# Patient Record
Sex: MALE | Race: BLACK OR AFRICAN AMERICAN | NOT HISPANIC OR LATINO | Employment: UNEMPLOYED | URBAN - METROPOLITAN AREA
[De-identification: names, ages, dates, MRNs, and addresses within clinical notes are randomized per-mention and may not be internally consistent; named-entity substitution may affect disease eponyms.]

---

## 2020-09-08 ENCOUNTER — HOSPITAL ENCOUNTER (EMERGENCY)
Facility: HOSPITAL | Age: 45
Discharge: HOME | End: 2020-09-08
Attending: EMERGENCY MEDICINE
Payer: MEDICAID

## 2020-09-08 VITALS
DIASTOLIC BLOOD PRESSURE: 86 MMHG | RESPIRATION RATE: 18 BRPM | HEIGHT: 73 IN | TEMPERATURE: 99 F | BODY MASS INDEX: 30.48 KG/M2 | SYSTOLIC BLOOD PRESSURE: 136 MMHG | WEIGHT: 230 LBS | OXYGEN SATURATION: 96 % | HEART RATE: 96 BPM

## 2020-09-08 DIAGNOSIS — H60.90 OTITIS EXTERNA, UNSPECIFIED CHRONICITY, UNSPECIFIED LATERALITY, UNSPECIFIED TYPE: Primary | ICD-10-CM

## 2020-09-08 DIAGNOSIS — J06.9 UPPER RESPIRATORY TRACT INFECTION, UNSPECIFIED TYPE: ICD-10-CM

## 2020-09-08 PROCEDURE — U0003 INFECTIOUS AGENT DETECTION BY NUCLEIC ACID (DNA OR RNA); SEVERE ACUTE RESPIRATORY SYNDROME CORONAVIRUS 2 (SARS-COV-2) (CORONAVIRUS DISEASE [COVID-19]), AMPLIFIED PROBE TECHNIQUE, MAKING USE OF HIGH THROUGHPUT TECHNOLOGIES AS DESCRIBED BY CMS-2020-01-R: HCPCS

## 2020-09-08 PROCEDURE — 99283 EMERGENCY DEPT VISIT LOW MDM: CPT

## 2020-09-08 RX ORDER — OFLOXACIN 3 MG/ML
3 SOLUTION AURICULAR (OTIC) 2 TIMES DAILY
Qty: 42 DROP | Refills: 0 | Status: SHIPPED | OUTPATIENT
Start: 2020-09-08 | End: 2020-09-15

## 2020-09-08 RX ORDER — CIPROFLOXACIN 500 MG/1
500 TABLET ORAL 2 TIMES DAILY
Qty: 20 TABLET | Refills: 0 | Status: SHIPPED | OUTPATIENT
Start: 2020-09-08 | End: 2020-09-08 | Stop reason: CLARIF

## 2020-09-08 NOTE — DISCHARGE INSTRUCTIONS
Please follow-up with your primary care physician.  Do not smoke.  Return to the emergency department if you have any change or concerning symptoms.  Rest.  Use the ear drops as prescribed until completed.

## 2020-09-08 NOTE — ED TRIAGE NOTES
Pt. Reports left ear pain with drainage from the ear. Pt. Reports he has a productive cough- greenish in color. Pt. Reports he has been having symptoms for the past 3 days.

## 2020-09-08 NOTE — ED PROVIDER NOTES
Encounter Date: 9/8/2020    SCRIBE #1 NOTE: IAquiles, am scribing for, and in the presence of,  Bree Wong PA-C. I have scribed the following portions of the note - Other sections scribed: HPI/ROS.       History     Chief Complaint   Patient presents with    Otalgia     left ear pain started x 3 days     Pt seen by provider at 10:48    This 45 y.o. male with no pertinent medical history presents to the ED for an emergent evaluation of L otalgia with associated drainage x 4 days. Rates as 7/10, but states pain was worse at initial onset. Pt notes that transmission fluid possibly got into his ear recently while he was working. He notes similar symptoms has happened in the past, but it was a while ago. Pt attempted tx previously with Dayquil and Robitussin without relief. Pt reports improvement with productive cough with yellow sputum with these medications. Cough has been present x 2-3 days. Pt states he smokes tobacco 3x/week. No recent COVID-19 exposures. Otherwise, pt denies fever, chills, sore throat, n/v/d/, and any other associated symptoms.    The history is provided by the patient. No  was used.     Review of patient's allergies indicates:  No Known Allergies  History reviewed. No pertinent past medical history.  History reviewed. No pertinent surgical history.  History reviewed. No pertinent family history.  Social History     Tobacco Use    Smoking status: Light Tobacco Smoker     Types: Cigarettes    Smokeless tobacco: Never Used   Substance Use Topics    Alcohol use: Never     Frequency: Never    Drug use: Yes     Types: Marijuana     Review of Systems   Constitutional: Negative for chills and fever.   HENT: Positive for ear discharge and ear pain. Negative for congestion, rhinorrhea and sore throat.    Eyes: Negative for pain.   Respiratory: Positive for cough. Negative for shortness of breath.    Gastrointestinal: Negative for diarrhea, nausea and vomiting.    Musculoskeletal: Negative for myalgias.   Skin: Negative for rash.   Neurological: Negative for headaches.   All other systems reviewed and are negative.      Physical Exam     Initial Vitals [09/08/20 1023]   BP Pulse Resp Temp SpO2   132/73 66 20 98.5 °F (36.9 °C) 98 %      MAP       --         Physical Exam    Nursing note and vitals reviewed.  Constitutional: He appears well-developed and well-nourished. He is not diaphoretic. No distress.   HENT:   Head: Normocephalic and atraumatic.   Nose: Nose normal.   Mouth/Throat: Oropharynx is clear and moist.   The bilateral tympanic membranes are clear and intact.  There is tragal tenderness noted to the left tragus.  There is also erythema and edema noted to the left external auditory canal.   Eyes: EOM are normal. Pupils are equal, round, and reactive to light.   Neck: Normal range of motion. Neck supple.   Cardiovascular: Normal rate.   Pulmonary/Chest: Breath sounds normal. No respiratory distress. He has no wheezes. He has no rhonchi. He has no rales.   Abdominal: Soft. Bowel sounds are normal. He exhibits no distension. There is no abdominal tenderness. There is no rebound and no guarding.   Musculoskeletal: Normal range of motion. No tenderness or edema.   Neurological: He is alert and oriented to person, place, and time.   Skin: Skin is warm. Capillary refill takes less than 2 seconds.         ED Course   Procedures  Labs Reviewed   SARS-COV-2 (COVID-19) QUALITATIVE PCR          Imaging Results    None                APC / Resident Notes:   This is an urgent evaluation of a 45-year-old male who presents to the emergency department with left ear pain.  On further interview, the patient reports that he has had some mild upper respiratory symptoms and a mild cough.  He is a smoker.    The patient is currently afebrile and nontoxic in appearance.  His vital signs are stable.  On physical exam, there is evidence of otitis externa to left ear.  The bilateral  tympanic membranes are clear and intact without any evidence of otitis media.  There is no meningismus.  No evidence of pneumonia.  The remaining physical exam is unremarkable.  While in the emergency department a routine COVID screening was performed.  I will also treat him for left otitis externa with ear drops.  He will need to follow up with his primary care physician.  This was discussed at length with the patient verbalized understanding and agreement.  He is currently safe and stable for discharge at this time.       Scribe Attestation:   Scribe #1: I performed the above scribed service and the documentation accurately describes the services I performed. I attest to the accuracy of the note.                          Clinical Impression:       ICD-10-CM ICD-9-CM   1. Otitis externa, unspecified chronicity, unspecified laterality, unspecified type  H60.90 380.10   2. Upper respiratory tract infection, unspecified type  J06.9 465.9           Scribe Attestation: I, Bree Wong PA-C, personally performed the services described in this documentation. All medical record entries made by the scribe were at my direction and in my presence. I have reviewed the chart and agree that the record reflects my personal performance and is accurate and complete.   ED Disposition Condition    Discharge Stable        ED Prescriptions     Medication Sig Dispense Start Date End Date Auth. Provider    ciprofloxacin HCl (CIPRO) 500 MG tablet  (Status: Discontinued) Take 1 tablet (500 mg total) by mouth 2 (two) times daily. for 10 days 20 tablet 9/8/2020 9/8/2020 Bree Wong PA-C    ofloxacin (FLOXIN) 0.3 % otic solution Place 3 drops into the left ear 2 (two) times daily. for 7 days 42 drop 9/8/2020 9/15/2020 Bree Wong PA-C        Follow-up Information    None                                    Bree Wong PA-C  09/08/20 8880

## 2020-09-09 LAB — SARS-COV-2 RNA RESP QL NAA+PROBE: NOT DETECTED

## 2020-10-20 ENCOUNTER — HOSPITAL ENCOUNTER (EMERGENCY)
Facility: HOSPITAL | Age: 45
Discharge: HOME | End: 2020-10-20
Attending: EMERGENCY MEDICINE
Payer: MEDICAID

## 2020-10-20 VITALS
HEIGHT: 74 IN | HEART RATE: 63 BPM | SYSTOLIC BLOOD PRESSURE: 153 MMHG | WEIGHT: 225 LBS | RESPIRATION RATE: 18 BRPM | DIASTOLIC BLOOD PRESSURE: 92 MMHG | TEMPERATURE: 98 F | OXYGEN SATURATION: 99 % | BODY MASS INDEX: 28.88 KG/M2

## 2020-10-20 DIAGNOSIS — S69.92XA FISH HOOK INJURY OF FINGER OF LEFT HAND, INITIAL ENCOUNTER: Primary | ICD-10-CM

## 2020-10-20 PROCEDURE — 99284 EMERGENCY DEPT VISIT MOD MDM: CPT | Mod: 25

## 2020-10-20 PROCEDURE — 25000003 PHARM REV CODE 250: Performed by: NURSE PRACTITIONER

## 2020-10-20 RX ORDER — LEVOFLOXACIN 750 MG/1
750 TABLET ORAL DAILY
Qty: 10 TABLET | Refills: 0 | Status: SHIPPED | OUTPATIENT
Start: 2020-10-20 | End: 2020-10-30

## 2020-10-20 RX ORDER — LEVOFLOXACIN 750 MG/1
750 TABLET ORAL
Status: COMPLETED | OUTPATIENT
Start: 2020-10-20 | End: 2020-10-20

## 2020-10-20 RX ORDER — CLINDAMYCIN HYDROCHLORIDE 150 MG/1
450 CAPSULE ORAL EVERY 8 HOURS
Qty: 90 CAPSULE | Refills: 0 | Status: SHIPPED | OUTPATIENT
Start: 2020-10-20 | End: 2020-10-30

## 2020-10-20 RX ORDER — LIDOCAINE HYDROCHLORIDE 10 MG/ML
5 INJECTION INFILTRATION; PERINEURAL
Status: COMPLETED | OUTPATIENT
Start: 2020-10-20 | End: 2020-10-20

## 2020-10-20 RX ORDER — CLINDAMYCIN HYDROCHLORIDE 150 MG/1
450 CAPSULE ORAL
Status: COMPLETED | OUTPATIENT
Start: 2020-10-20 | End: 2020-10-20

## 2020-10-20 RX ADMIN — LEVOFLOXACIN 750 MG: 750 TABLET, FILM COATED ORAL at 12:10

## 2020-10-20 RX ADMIN — CLINDAMYCIN HYDROCHLORIDE 450 MG: 150 CAPSULE ORAL at 12:10

## 2020-10-20 RX ADMIN — BACITRACIN, NEOMYCIN, POLYMYXIN B 1 EACH: 400; 3.5; 5 OINTMENT TOPICAL at 12:10

## 2020-10-20 RX ADMIN — LIDOCAINE HYDROCHLORIDE 5 ML: 10 INJECTION, SOLUTION INFILTRATION; PERINEURAL at 12:10

## 2020-10-20 NOTE — DISCHARGE INSTRUCTIONS
Take antibiotics as ordered. Keep clean and dry.  May use neosporin.  Return to the Emergency department for any worsening or failure to improve, otherwise follow up with your primary care provider.

## 2020-10-20 NOTE — ED TRIAGE NOTES
" Pt arrives tobias the ED reports that "A fish hook gets in my hand while I was fishing." Denies any other medical concern at this time  "
>120
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
## 2022-06-03 ENCOUNTER — TELEPHONE (OUTPATIENT)
Dept: FAMILY MEDICINE | Facility: CLINIC | Age: 47
End: 2022-06-03
Payer: MEDICAID

## 2025-02-10 ENCOUNTER — HOSPITAL ENCOUNTER (INPATIENT)
Facility: HOSPITAL | Age: 50
LOS: 2 days | Discharge: HOME | DRG: 638 | End: 2025-02-13
Attending: EMERGENCY MEDICINE | Admitting: HOSPITALIST
Payer: MEDICAID

## 2025-02-10 DIAGNOSIS — E11.65 TYPE 2 DIABETES MELLITUS WITH HYPERGLYCEMIA, WITH LONG-TERM CURRENT USE OF INSULIN: Primary | ICD-10-CM

## 2025-02-10 DIAGNOSIS — R03.0 ELEVATED BP WITHOUT DIAGNOSIS OF HYPERTENSION: ICD-10-CM

## 2025-02-10 DIAGNOSIS — Z79.4 TYPE 2 DIABETES MELLITUS WITH HYPERGLYCEMIA, WITH LONG-TERM CURRENT USE OF INSULIN: Primary | ICD-10-CM

## 2025-02-10 DIAGNOSIS — R05.9 COUGH: ICD-10-CM

## 2025-02-10 DIAGNOSIS — E11.10 DKA (DIABETIC KETOACIDOSIS): ICD-10-CM

## 2025-02-10 DIAGNOSIS — I46.9 CARDIAC ARREST: ICD-10-CM

## 2025-02-10 PROBLEM — E87.6 HYPOKALEMIA: Status: ACTIVE | Noted: 2025-02-10

## 2025-02-10 PROBLEM — N17.9 AKI (ACUTE KIDNEY INJURY): Status: ACTIVE | Noted: 2025-02-10

## 2025-02-10 PROBLEM — I10 HYPERTENSION: Status: ACTIVE | Noted: 2025-02-10

## 2025-02-10 LAB
ALBUMIN SERPL BCP-MCNC: 4.1 G/DL (ref 3.5–5.2)
ALLENS TEST: ABNORMAL
ALP SERPL-CCNC: 130 U/L (ref 40–150)
ALT SERPL W/O P-5'-P-CCNC: 30 U/L (ref 10–44)
ANION GAP SERPL CALC-SCNC: 15 MMOL/L (ref 8–16)
ANION GAP SERPL CALC-SCNC: 16 MMOL/L (ref 8–16)
ANION GAP SERPL CALC-SCNC: 18 MMOL/L (ref 8–16)
ANION GAP SERPL CALC-SCNC: 19 MMOL/L (ref 8–16)
ANION GAP SERPL CALC-SCNC: 22 MMOL/L (ref 8–16)
ANION GAP SERPL CALC-SCNC: 23 MMOL/L (ref 8–16)
ANION GAP SERPL CALC-SCNC: 24 MMOL/L (ref 8–16)
APTT PPP: 25.4 SEC (ref 21–32)
AST SERPL-CCNC: 16 U/L (ref 10–40)
B-OH-BUTYR BLD STRIP-SCNC: 2.1 MMOL/L (ref 0–0.5)
BASOPHILS # BLD AUTO: 0.03 K/UL (ref 0–0.2)
BASOPHILS # BLD AUTO: 0.05 K/UL (ref 0–0.2)
BASOPHILS NFR BLD: 0.3 % (ref 0–1.9)
BASOPHILS NFR BLD: 0.5 % (ref 0–1.9)
BILIRUB SERPL-MCNC: 0.4 MG/DL (ref 0.1–1)
BUN SERPL-MCNC: 10 MG/DL (ref 6–20)
BUN SERPL-MCNC: 12 MG/DL (ref 6–20)
BUN SERPL-MCNC: 9 MG/DL (ref 6–20)
CALCIUM SERPL-MCNC: 8.4 MG/DL (ref 8.7–10.5)
CALCIUM SERPL-MCNC: 8.5 MG/DL (ref 8.7–10.5)
CALCIUM SERPL-MCNC: 9 MG/DL (ref 8.7–10.5)
CHLORIDE SERPL-SCNC: 103 MMOL/L (ref 95–110)
CHLORIDE SERPL-SCNC: 104 MMOL/L (ref 95–110)
CHLORIDE SERPL-SCNC: 105 MMOL/L (ref 95–110)
CHLORIDE SERPL-SCNC: 105 MMOL/L (ref 95–110)
CHLORIDE SERPL-SCNC: 95 MMOL/L (ref 95–110)
CHLORIDE SERPL-SCNC: 96 MMOL/L (ref 95–110)
CHLORIDE SERPL-SCNC: 96 MMOL/L (ref 95–110)
CO2 SERPL-SCNC: 10 MMOL/L (ref 23–29)
CO2 SERPL-SCNC: 10 MMOL/L (ref 23–29)
CO2 SERPL-SCNC: 12 MMOL/L (ref 23–29)
CO2 SERPL-SCNC: 14 MMOL/L (ref 23–29)
CO2 SERPL-SCNC: 15 MMOL/L (ref 23–29)
CO2 SERPL-SCNC: 17 MMOL/L (ref 23–29)
CO2 SERPL-SCNC: 8 MMOL/L (ref 23–29)
CREAT SERPL-MCNC: 0.9 MG/DL (ref 0.5–1.4)
CREAT SERPL-MCNC: 1 MG/DL (ref 0.5–1.4)
CREAT SERPL-MCNC: 1 MG/DL (ref 0.5–1.4)
CREAT SERPL-MCNC: 1.1 MG/DL (ref 0.5–1.4)
CREAT SERPL-MCNC: 1.4 MG/DL (ref 0.5–1.4)
CREAT SERPL-MCNC: 1.5 MG/DL (ref 0.5–1.4)
CREAT SERPL-MCNC: 1.6 MG/DL (ref 0.5–1.4)
DELSYS: ABNORMAL
DIFFERENTIAL METHOD BLD: ABNORMAL
DIFFERENTIAL METHOD BLD: ABNORMAL
EOSINOPHIL # BLD AUTO: 0.1 K/UL (ref 0–0.5)
EOSINOPHIL # BLD AUTO: 0.2 K/UL (ref 0–0.5)
EOSINOPHIL NFR BLD: 0.7 % (ref 0–8)
EOSINOPHIL NFR BLD: 1.7 % (ref 0–8)
ERYTHROCYTE [DISTWIDTH] IN BLOOD BY AUTOMATED COUNT: 12.8 % (ref 11.5–14.5)
ERYTHROCYTE [DISTWIDTH] IN BLOOD BY AUTOMATED COUNT: 12.9 % (ref 11.5–14.5)
EST. GFR  (NO RACE VARIABLE): 52 ML/MIN/1.73 M^2
EST. GFR  (NO RACE VARIABLE): 56 ML/MIN/1.73 M^2
EST. GFR  (NO RACE VARIABLE): >60 ML/MIN/1.73 M^2
ESTIMATED AVG GLUCOSE: 355 MG/DL (ref 68–131)
GLUCOSE SERPL-MCNC: 142 MG/DL (ref 70–110)
GLUCOSE SERPL-MCNC: 162 MG/DL (ref 70–110)
GLUCOSE SERPL-MCNC: 233 MG/DL (ref 70–110)
GLUCOSE SERPL-MCNC: 329 MG/DL (ref 70–110)
GLUCOSE SERPL-MCNC: 542 MG/DL (ref 70–110)
GLUCOSE SERPL-MCNC: 638 MG/DL (ref 70–110)
GLUCOSE SERPL-MCNC: 710 MG/DL (ref 70–110)
HBA1C MFR BLD: 14 % (ref 4–5.6)
HCO3 UR-SCNC: 18 MMOL/L (ref 24–28)
HCT VFR BLD AUTO: 44.3 % (ref 40–54)
HCT VFR BLD AUTO: 48.7 % (ref 40–54)
HCT VFR BLD CALC: 51 %PCV (ref 36–54)
HGB BLD-MCNC: 15.5 G/DL (ref 14–18)
HGB BLD-MCNC: 16.6 G/DL (ref 14–18)
HGB BLD-MCNC: 17 G/DL
IMM GRANULOCYTES # BLD AUTO: 0.04 K/UL (ref 0–0.04)
IMM GRANULOCYTES # BLD AUTO: 0.04 K/UL (ref 0–0.04)
IMM GRANULOCYTES NFR BLD AUTO: 0.4 % (ref 0–0.5)
IMM GRANULOCYTES NFR BLD AUTO: 0.4 % (ref 0–0.5)
INFLUENZA A, MOLECULAR: NEGATIVE
INFLUENZA B, MOLECULAR: NEGATIVE
LACTATE SERPL-SCNC: 1.5 MMOL/L (ref 0.5–2.2)
LYMPHOCYTES # BLD AUTO: 2.2 K/UL (ref 1–4.8)
LYMPHOCYTES # BLD AUTO: 3.6 K/UL (ref 1–4.8)
LYMPHOCYTES NFR BLD: 22.3 % (ref 18–48)
LYMPHOCYTES NFR BLD: 38.4 % (ref 18–48)
MAGNESIUM SERPL-MCNC: 2.1 MG/DL (ref 1.6–2.6)
MAGNESIUM SERPL-MCNC: 2.2 MG/DL (ref 1.6–2.6)
MCH RBC QN AUTO: 31.5 PG (ref 27–31)
MCH RBC QN AUTO: 31.8 PG (ref 27–31)
MCHC RBC AUTO-ENTMCNC: 34.1 G/DL (ref 32–36)
MCHC RBC AUTO-ENTMCNC: 35 G/DL (ref 32–36)
MCV RBC AUTO: 91 FL (ref 82–98)
MCV RBC AUTO: 92 FL (ref 82–98)
MODE: ABNORMAL
MONOCYTES # BLD AUTO: 0.8 K/UL (ref 0.3–1)
MONOCYTES # BLD AUTO: 0.8 K/UL (ref 0.3–1)
MONOCYTES NFR BLD: 8.2 % (ref 4–15)
MONOCYTES NFR BLD: 8.9 % (ref 4–15)
NEUTROPHILS # BLD AUTO: 4.7 K/UL (ref 1.8–7.7)
NEUTROPHILS # BLD AUTO: 6.8 K/UL (ref 1.8–7.7)
NEUTROPHILS NFR BLD: 50.3 % (ref 38–73)
NEUTROPHILS NFR BLD: 67.9 % (ref 38–73)
NRBC BLD-RTO: 0 /100 WBC
NRBC BLD-RTO: 0 /100 WBC
PCO2 BLDA: 33.2 MMHG (ref 35–45)
PH SMN: 7.34 [PH] (ref 7.35–7.45)
PHOSPHATE SERPL-MCNC: 2.5 MG/DL (ref 2.7–4.5)
PHOSPHATE SERPL-MCNC: 4.2 MG/DL (ref 2.7–4.5)
PHOSPHATE SERPL-MCNC: 4.3 MG/DL (ref 2.7–4.5)
PLATELET # BLD AUTO: 195 K/UL (ref 150–450)
PLATELET # BLD AUTO: 208 K/UL (ref 150–450)
PMV BLD AUTO: 11.7 FL (ref 9.2–12.9)
PMV BLD AUTO: 12 FL (ref 9.2–12.9)
PO2 BLDA: 51 MMHG (ref 40–60)
POC BE: -8 MMOL/L
POC SATURATED O2: 84 % (ref 95–100)
POC TCO2: 19 MMOL/L (ref 24–29)
POCT GLUCOSE: 119 MG/DL (ref 70–110)
POCT GLUCOSE: 142 MG/DL (ref 70–110)
POCT GLUCOSE: 170 MG/DL (ref 70–110)
POCT GLUCOSE: 175 MG/DL (ref 70–110)
POCT GLUCOSE: 200 MG/DL (ref 70–110)
POCT GLUCOSE: 255 MG/DL (ref 70–110)
POCT GLUCOSE: 403 MG/DL (ref 70–110)
POCT GLUCOSE: >500 MG/DL (ref 70–110)
POCT GLUCOSE: >500 MG/DL (ref 70–110)
POTASSIUM BLD-SCNC: 4.5 MMOL/L (ref 3.5–5.1)
POTASSIUM SERPL-SCNC: 3.2 MMOL/L (ref 3.5–5.1)
POTASSIUM SERPL-SCNC: 3.3 MMOL/L (ref 3.5–5.1)
POTASSIUM SERPL-SCNC: 3.4 MMOL/L (ref 3.5–5.1)
POTASSIUM SERPL-SCNC: 3.7 MMOL/L (ref 3.5–5.1)
POTASSIUM SERPL-SCNC: 4.3 MMOL/L (ref 3.5–5.1)
POTASSIUM SERPL-SCNC: 4.5 MMOL/L (ref 3.5–5.1)
POTASSIUM SERPL-SCNC: 4.6 MMOL/L (ref 3.5–5.1)
PROT SERPL-MCNC: 8.2 G/DL (ref 6–8.4)
RBC # BLD AUTO: 4.88 M/UL (ref 4.6–6.2)
RBC # BLD AUTO: 5.27 M/UL (ref 4.6–6.2)
SAMPLE: ABNORMAL
SARS-COV-2 RDRP RESP QL NAA+PROBE: NEGATIVE
SITE: ABNORMAL
SODIUM BLD-SCNC: 129 MMOL/L (ref 136–145)
SODIUM SERPL-SCNC: 127 MMOL/L (ref 136–145)
SODIUM SERPL-SCNC: 128 MMOL/L (ref 136–145)
SODIUM SERPL-SCNC: 129 MMOL/L (ref 136–145)
SODIUM SERPL-SCNC: 134 MMOL/L (ref 136–145)
SODIUM SERPL-SCNC: 136 MMOL/L (ref 136–145)
SODIUM SERPL-SCNC: 136 MMOL/L (ref 136–145)
SODIUM SERPL-SCNC: 137 MMOL/L (ref 136–145)
SPECIMEN SOURCE: NORMAL
TROPONIN I SERPL DL<=0.01 NG/ML-MCNC: 0.01 NG/ML (ref 0–0.03)
WBC # BLD AUTO: 9.42 K/UL (ref 3.9–12.7)
WBC # BLD AUTO: 9.95 K/UL (ref 3.9–12.7)

## 2025-02-10 PROCEDURE — 80053 COMPREHEN METABOLIC PANEL: CPT | Performed by: EMERGENCY MEDICINE

## 2025-02-10 PROCEDURE — 63600175 PHARM REV CODE 636 W HCPCS: Performed by: STUDENT IN AN ORGANIZED HEALTH CARE EDUCATION/TRAINING PROGRAM

## 2025-02-10 PROCEDURE — 99291 CRITICAL CARE FIRST HOUR: CPT

## 2025-02-10 PROCEDURE — 63600175 PHARM REV CODE 636 W HCPCS

## 2025-02-10 PROCEDURE — 36415 COLL VENOUS BLD VENIPUNCTURE: CPT | Mod: XB | Performed by: STUDENT IN AN ORGANIZED HEALTH CARE EDUCATION/TRAINING PROGRAM

## 2025-02-10 PROCEDURE — 83036 HEMOGLOBIN GLYCOSYLATED A1C: CPT | Performed by: STUDENT IN AN ORGANIZED HEALTH CARE EDUCATION/TRAINING PROGRAM

## 2025-02-10 PROCEDURE — 94761 N-INVAS EAR/PLS OXIMETRY MLT: CPT | Mod: XB

## 2025-02-10 PROCEDURE — 82010 KETONE BODYS QUAN: CPT | Performed by: EMERGENCY MEDICINE

## 2025-02-10 PROCEDURE — 80048 BASIC METABOLIC PNL TOTAL CA: CPT | Mod: 91,XB | Performed by: EMERGENCY MEDICINE

## 2025-02-10 PROCEDURE — 63600175 PHARM REV CODE 636 W HCPCS: Performed by: EMERGENCY MEDICINE

## 2025-02-10 PROCEDURE — 84484 ASSAY OF TROPONIN QUANT: CPT | Performed by: EMERGENCY MEDICINE

## 2025-02-10 PROCEDURE — G0378 HOSPITAL OBSERVATION PER HR: HCPCS

## 2025-02-10 PROCEDURE — 96372 THER/PROPH/DIAG INJ SC/IM: CPT | Performed by: STUDENT IN AN ORGANIZED HEALTH CARE EDUCATION/TRAINING PROGRAM

## 2025-02-10 PROCEDURE — S5010 5% DEXTROSE AND 0.45% SALINE: HCPCS | Performed by: STUDENT IN AN ORGANIZED HEALTH CARE EDUCATION/TRAINING PROGRAM

## 2025-02-10 PROCEDURE — 82803 BLOOD GASES ANY COMBINATION: CPT

## 2025-02-10 PROCEDURE — 84100 ASSAY OF PHOSPHORUS: CPT | Mod: 91 | Performed by: EMERGENCY MEDICINE

## 2025-02-10 PROCEDURE — 25000003 PHARM REV CODE 250: Performed by: EMERGENCY MEDICINE

## 2025-02-10 PROCEDURE — 85025 COMPLETE CBC W/AUTO DIFF WBC: CPT | Mod: 91 | Performed by: HOSPITALIST

## 2025-02-10 PROCEDURE — 93010 ELECTROCARDIOGRAM REPORT: CPT | Mod: ,,, | Performed by: INTERNAL MEDICINE

## 2025-02-10 PROCEDURE — 85730 THROMBOPLASTIN TIME PARTIAL: CPT | Performed by: HOSPITALIST

## 2025-02-10 PROCEDURE — 87502 INFLUENZA DNA AMP PROBE: CPT | Performed by: EMERGENCY MEDICINE

## 2025-02-10 PROCEDURE — 25000003 PHARM REV CODE 250: Performed by: STUDENT IN AN ORGANIZED HEALTH CARE EDUCATION/TRAINING PROGRAM

## 2025-02-10 PROCEDURE — 87635 SARS-COV-2 COVID-19 AMP PRB: CPT | Performed by: EMERGENCY MEDICINE

## 2025-02-10 PROCEDURE — 93005 ELECTROCARDIOGRAM TRACING: CPT

## 2025-02-10 PROCEDURE — 83605 ASSAY OF LACTIC ACID: CPT | Performed by: STUDENT IN AN ORGANIZED HEALTH CARE EDUCATION/TRAINING PROGRAM

## 2025-02-10 PROCEDURE — 85025 COMPLETE CBC W/AUTO DIFF WBC: CPT | Performed by: EMERGENCY MEDICINE

## 2025-02-10 PROCEDURE — 83735 ASSAY OF MAGNESIUM: CPT | Performed by: EMERGENCY MEDICINE

## 2025-02-10 PROCEDURE — 99900035 HC TECH TIME PER 15 MIN (STAT)

## 2025-02-10 PROCEDURE — 80048 BASIC METABOLIC PNL TOTAL CA: CPT | Mod: 91,XB | Performed by: STUDENT IN AN ORGANIZED HEALTH CARE EDUCATION/TRAINING PROGRAM

## 2025-02-10 RX ORDER — LIDOCAINE 50 MG/G
1 PATCH TOPICAL
Status: DISCONTINUED | OUTPATIENT
Start: 2025-02-10 | End: 2025-02-13 | Stop reason: HOSPADM

## 2025-02-10 RX ORDER — ONDANSETRON HYDROCHLORIDE 2 MG/ML
4 INJECTION, SOLUTION INTRAVENOUS EVERY 6 HOURS PRN
Status: DISCONTINUED | OUTPATIENT
Start: 2025-02-10 | End: 2025-02-13 | Stop reason: HOSPADM

## 2025-02-10 RX ORDER — SODIUM CHLORIDE 9 MG/ML
1000 INJECTION, SOLUTION INTRAVENOUS CONTINUOUS
Status: DISCONTINUED | OUTPATIENT
Start: 2025-02-10 | End: 2025-02-10

## 2025-02-10 RX ORDER — IBUPROFEN 200 MG
200 TABLET ORAL
COMMUNITY

## 2025-02-10 RX ORDER — GLUCAGON 1 MG
1 KIT INJECTION
Status: DISCONTINUED | OUTPATIENT
Start: 2025-02-10 | End: 2025-02-13 | Stop reason: HOSPADM

## 2025-02-10 RX ORDER — IBUPROFEN 200 MG
24 TABLET ORAL
Status: DISCONTINUED | OUTPATIENT
Start: 2025-02-10 | End: 2025-02-13 | Stop reason: HOSPADM

## 2025-02-10 RX ORDER — ASPIRIN 81 MG/1
81 TABLET ORAL
COMMUNITY

## 2025-02-10 RX ORDER — CARVEDILOL 6.25 MG/1
6.25 TABLET ORAL 2 TIMES DAILY
Status: DISCONTINUED | OUTPATIENT
Start: 2025-02-10 | End: 2025-02-13 | Stop reason: HOSPADM

## 2025-02-10 RX ORDER — ACETAMINOPHEN 325 MG/1
650 TABLET ORAL EVERY 4 HOURS PRN
Status: DISCONTINUED | OUTPATIENT
Start: 2025-02-10 | End: 2025-02-13 | Stop reason: HOSPADM

## 2025-02-10 RX ORDER — DEXTROSE MONOHYDRATE AND SODIUM CHLORIDE 5; .45 G/100ML; G/100ML
INJECTION, SOLUTION INTRAVENOUS CONTINUOUS PRN
Status: DISCONTINUED | OUTPATIENT
Start: 2025-02-10 | End: 2025-02-10

## 2025-02-10 RX ORDER — LABETALOL HYDROCHLORIDE 5 MG/ML
10 INJECTION, SOLUTION INTRAVENOUS
Status: DISCONTINUED | OUTPATIENT
Start: 2025-02-10 | End: 2025-02-13 | Stop reason: HOSPADM

## 2025-02-10 RX ORDER — SODIUM CHLORIDE 9 MG/ML
125 INJECTION, SOLUTION INTRAVENOUS CONTINUOUS
Status: DISCONTINUED | OUTPATIENT
Start: 2025-02-10 | End: 2025-02-12

## 2025-02-10 RX ORDER — INSULIN GLARGINE 100 [IU]/ML
8 INJECTION, SOLUTION SUBCUTANEOUS DAILY
Status: DISCONTINUED | OUTPATIENT
Start: 2025-02-10 | End: 2025-02-12

## 2025-02-10 RX ORDER — ACETAMINOPHEN 500 MG
1000 TABLET ORAL
Status: COMPLETED | OUTPATIENT
Start: 2025-02-10 | End: 2025-02-10

## 2025-02-10 RX ORDER — ACETAMINOPHEN 500 MG
500 TABLET ORAL
COMMUNITY

## 2025-02-10 RX ORDER — INSULIN ASPART 100 [IU]/ML
0-5 INJECTION, SOLUTION INTRAVENOUS; SUBCUTANEOUS
Status: DISCONTINUED | OUTPATIENT
Start: 2025-02-10 | End: 2025-02-13 | Stop reason: HOSPADM

## 2025-02-10 RX ORDER — AMLODIPINE BESYLATE 5 MG/1
5 TABLET ORAL DAILY
Status: DISCONTINUED | OUTPATIENT
Start: 2025-02-10 | End: 2025-02-13 | Stop reason: HOSPADM

## 2025-02-10 RX ORDER — DEXTROSE MONOHYDRATE AND SODIUM CHLORIDE 5; .45 G/100ML; G/100ML
125 INJECTION, SOLUTION INTRAVENOUS CONTINUOUS PRN
Status: DISCONTINUED | OUTPATIENT
Start: 2025-02-10 | End: 2025-02-13 | Stop reason: HOSPADM

## 2025-02-10 RX ORDER — SODIUM CHLORIDE 0.9 % (FLUSH) 0.9 %
10 SYRINGE (ML) INJECTION
Status: DISCONTINUED | OUTPATIENT
Start: 2025-02-10 | End: 2025-02-13 | Stop reason: HOSPADM

## 2025-02-10 RX ORDER — IBUPROFEN 200 MG
16 TABLET ORAL
Status: DISCONTINUED | OUTPATIENT
Start: 2025-02-10 | End: 2025-02-13 | Stop reason: HOSPADM

## 2025-02-10 RX ORDER — SODIUM CHLORIDE 0.9 % (FLUSH) 0.9 %
10 SYRINGE (ML) INJECTION
Status: DISCONTINUED | OUTPATIENT
Start: 2025-02-10 | End: 2025-02-10

## 2025-02-10 RX ORDER — HYDROCODONE BITARTRATE AND ACETAMINOPHEN 5; 325 MG/1; MG/1
1 TABLET ORAL EVERY 8 HOURS PRN
Status: DISCONTINUED | OUTPATIENT
Start: 2025-02-10 | End: 2025-02-13 | Stop reason: HOSPADM

## 2025-02-10 RX ORDER — POTASSIUM CHLORIDE 20 MEQ/1
40 TABLET, EXTENDED RELEASE ORAL ONCE
Status: COMPLETED | OUTPATIENT
Start: 2025-02-10 | End: 2025-02-10

## 2025-02-10 RX ORDER — POLYETHYLENE GLYCOL 3350 17 G/17G
17 POWDER, FOR SOLUTION ORAL DAILY
Status: DISCONTINUED | OUTPATIENT
Start: 2025-02-11 | End: 2025-02-13 | Stop reason: HOSPADM

## 2025-02-10 RX ADMIN — SODIUM CHLORIDE 0.05 UNITS/KG/HR: 9 INJECTION, SOLUTION INTRAVENOUS at 08:02

## 2025-02-10 RX ADMIN — CARVEDILOL 6.25 MG: 6.25 TABLET, FILM COATED ORAL at 05:02

## 2025-02-10 RX ADMIN — DEXTROSE AND SODIUM CHLORIDE 125 ML/HR: 5; 450 INJECTION, SOLUTION INTRAVENOUS at 08:02

## 2025-02-10 RX ADMIN — SODIUM CHLORIDE 1000 ML: 9 INJECTION, SOLUTION INTRAVENOUS at 03:02

## 2025-02-10 RX ADMIN — LABETALOL HYDROCHLORIDE 10 MG: 5 INJECTION INTRAVENOUS at 05:02

## 2025-02-10 RX ADMIN — POTASSIUM CHLORIDE 40 MEQ: 1500 TABLET, EXTENDED RELEASE ORAL at 11:02

## 2025-02-10 RX ADMIN — SODIUM CHLORIDE 0.1 UNITS/KG/HR: 9 INJECTION, SOLUTION INTRAVENOUS at 03:02

## 2025-02-10 RX ADMIN — AMLODIPINE BESYLATE 5 MG: 5 TABLET ORAL at 05:02

## 2025-02-10 RX ADMIN — SODIUM CHLORIDE 125 ML/HR: 9 INJECTION, SOLUTION INTRAVENOUS at 04:02

## 2025-02-10 RX ADMIN — INSULIN GLARGINE 8 UNITS: 100 INJECTION, SOLUTION SUBCUTANEOUS at 10:02

## 2025-02-10 RX ADMIN — SODIUM CHLORIDE 0.1 UNITS/KG/HR: 9 INJECTION, SOLUTION INTRAVENOUS at 04:02

## 2025-02-10 RX ADMIN — HYDROCODONE BITARTRATE AND ACETAMINOPHEN 1 TABLET: 5; 325 TABLET ORAL at 11:02

## 2025-02-10 RX ADMIN — ACETAMINOPHEN 1000 MG: 500 TABLET ORAL at 01:02

## 2025-02-10 RX ADMIN — LIDOCAINE 1 PATCH: 50 PATCH CUTANEOUS at 01:02

## 2025-02-10 NOTE — PHARMACY MED REC
"  [facility]           Pharmacy  Admission Medication History     The home medication history was taken by Micehlle Cunha.      Medication history obtained from Medications listed below were obtained from: Patient/family    Based on information gathered for medication list, you may go to "Admission" then "Reconcile Home Medications" tabs to review and/or act upon those items.     The home medication list has been updated by the Pharmacy department.   Please read ALL comments highlighted in yellow.   Please address this information as you see fit.    Feel free to contact us if you have any questions or require assistance.        No current facility-administered medications on file prior to encounter.     Current Outpatient Medications on File Prior to Encounter   Medication Sig Dispense Refill    acetaminophen (TYLENOL) 500 MG tablet Take 500 mg by mouth as needed for Pain.      aspirin (ECOTRIN) 81 MG EC tablet Take 81 mg by mouth as needed for Pain.      aspirin-acetaminophen-caffeine 250-250-65 mg (EXCEDRIN MIGRAINE) 250-250-65 mg per tablet Take 1 tablet by mouth every 6 (six) hours as needed for Pain.      ibuprofen (ADVIL,MOTRIN) 200 MG tablet Take 200 mg by mouth as needed for Pain.         Please address this information as you see fit.  Feel free to contact us if you have any questions or require assistance.    Michelle Cunha  [phone]                .          "

## 2025-02-10 NOTE — ED PROVIDER NOTES
"Encounter Date: 2/10/2025       History     Chief Complaint   Patient presents with    Flank Pain     Patient reports left side pain since yesterday. Patient states he woke up this morning, coughed, and heard a "pop" when the pain worsened. Tender to palpation. Denies urinary s/s.     Girish Haley is a 50 y.o. male who  has no past medical history on file.    The patient presents to the ED due cough as well as chest wall pain.  Patient states the yesterday he coughed and felt something "pop "in the left side of his ribcage.  He reports this has starting to hurt.  He denies any shortness of breath fever vomiting today.  Patient's blood pressure is noted to be elevated to 190/114 and he denies a history or diagnosis of hypertension    The history is provided by the patient.     Review of patient's allergies indicates:  No Known Allergies  Past Medical History:   Diagnosis Date    Hypertension 2/10/2025     Past Surgical History:   Procedure Laterality Date    Left leg surgery       No family history on file.  Social History     Tobacco Use    Smoking status: Light Smoker     Types: Cigarettes    Smokeless tobacco: Never   Substance Use Topics    Alcohol use: Not Currently    Drug use: Yes     Types: Marijuana     Review of Systems   Constitutional:  Negative for fever.   HENT:  Negative for sore throat.    Respiratory:  Negative for shortness of breath.    Cardiovascular:  Positive for chest pain.   Gastrointestinal:  Negative for nausea.   Genitourinary:  Negative for dysuria.   Musculoskeletal:  Negative for back pain.   Skin:  Negative for rash.   Neurological:  Negative for weakness.   Hematological:  Does not bruise/bleed easily.       Physical Exam     Initial Vitals [02/10/25 1241]   BP Pulse Resp Temp SpO2   (!) 190/114 75 18 97.6 °F (36.4 °C) 97 %      MAP       --         Physical Exam    Nursing note and vitals reviewed.  Constitutional: He appears well-developed and well-nourished. He is not " diaphoretic. No distress.   HENT:   Head: Normocephalic and atraumatic. Mouth/Throat: Oropharynx is clear and moist.   Eyes: EOM are normal. Pupils are equal, round, and reactive to light.   Neck: No tracheal deviation present.   Cardiovascular:  Normal rate, regular rhythm, normal heart sounds and intact distal pulses.           Pulmonary/Chest: Breath sounds normal. No stridor. No respiratory distress. He exhibits tenderness.   Abdominal: Abdomen is soft. He exhibits no distension and no mass. There is no abdominal tenderness.   Musculoskeletal:         General: No edema. Normal range of motion.     Neurological: He is alert and oriented to person, place, and time. No cranial nerve deficit or sensory deficit.   Skin: Skin is warm and dry. Capillary refill takes less than 2 seconds. No rash noted.   Psychiatric: He has a normal mood and affect.         ED Course   Procedures  Labs Reviewed   CBC W/ AUTO DIFFERENTIAL - Abnormal       Result Value    WBC 9.95      RBC 5.27      Hemoglobin 16.6      Hematocrit 48.7      MCV 92      MCH 31.5 (*)     MCHC 34.1      RDW 12.9      Platelets 208      MPV 12.0      Immature Granulocytes 0.4      Gran # (ANC) 6.8      Immature Grans (Abs) 0.04      Lymph # 2.2      Mono # 0.8      Eos # 0.1      Baso # 0.05      nRBC 0      Gran % 67.9      Lymph % 22.3      Mono % 8.2      Eosinophil % 0.7      Basophil % 0.5      Differential Method Automated     COMPREHENSIVE METABOLIC PANEL - Abnormal    Sodium 127 (*)     Potassium 4.5      Chloride 95      CO2 8 (*)     Glucose 710 (*)     BUN 12      Creatinine 1.6 (*)     Calcium 9.0      Total Protein 8.2      Albumin 4.1      Total Bilirubin 0.4      Alkaline Phosphatase 130      AST 16      ALT 30      eGFR 52 (*)     Anion Gap 24 (*)     Narrative:     CO2 and Glucose critical result(s) called and verbal readback   obtained from Alexander Saavedra RN. by VD1 02/10/2025 14:28   BETA - HYDROXYBUTYRATE, SERUM - Abnormal     Beta-Hydroxybutyrate 2.1 (*)     Narrative:     With next lab draw   BASIC METABOLIC PANEL - Abnormal    Sodium 128 (*)     Potassium 4.6      Chloride 96      CO2 10 (*)     Glucose 638 (*)     BUN 12      Creatinine 1.5 (*)     Calcium 9.0      Anion Gap 22 (*)     eGFR 56 (*)     Narrative:     Glucose critical result(s) called and verbal readback obtained from   Elizabeth Hu RN. by VD1 02/10/2025 15:28   BASIC METABOLIC PANEL - Abnormal    Sodium 129 (*)     Potassium 4.3      Chloride 96      CO2 10 (*)     Glucose 542 (*)     BUN 12      Creatinine 1.4      Calcium 9.0      Anion Gap 23 (*)     eGFR >60      Narrative:     Now and then every 2 hours while glucose remains above or  below critical value; then reduce to every 4 hours.  GLU critical result(s) called and verbal readback obtained from   JENAE MCFARLANE  by [facility] 02/10/2025 16:21   ISTAT PROCEDURE - Abnormal    POC PH 7.344 (*)     POC PCO2 33.2 (*)     POC PO2 51      POC HCO3 18.0 (*)     POC BE -8 (*)     POC SATURATED O2 84      POC Sodium 129 (*)     POC Potassium 4.5      POC TCO2 19 (*)     POC Hematocrit 51      POC HEMOGLOBIN 17      Sample VENOUS      Site Other      Allens Test N/A      DelSys Room Air      Mode SPONT     POCT GLUCOSE - Abnormal    POCT Glucose >500 (*)    POCT GLUCOSE - Abnormal    POCT Glucose >500 (*)    INFLUENZA A & B BY MOLECULAR    Influenza A, Molecular Negative      Influenza B, Molecular Negative      Flu A & B Source Nasal swab     SARS-COV-2 RNA AMPLIFICATION, QUAL    SARS-CoV-2 RNA, Amplification, Qual Negative     TROPONIN I    Troponin I 0.011     PHOSPHORUS    Phosphorus 4.2      Narrative:     With next lab draw   PHOSPHORUS    Phosphorus 4.3     MAGNESIUM    Magnesium 2.2      Narrative:     With next lab draw   MAGNESIUM   LACTIC ACID, PLASMA    Lactate (Lactic Acid) 1.5     MAGNESIUM    Magnesium 2.1          ECG Results              EKG 12-lead (Final result)        Collection Time Result Time QRS  Duration OHS QTC Calculation    02/10/25 13:48:51 02/11/25 07:37:21 94 443                     Final result by Interface, Lab [facility] (02/11/25 07:37:24)                   Narrative:    Test Reason : R03.0,    Vent. Rate :  77 BPM     Atrial Rate :  77 BPM     P-R Int : 184 ms          QRS Dur :  94 ms      QT Int : 392 ms       P-R-T Axes :  49  28  41 degrees    QTcB Int : 443 ms    Normal sinus rhythm  poor precordial R wave progression  Nonspecific T wave abnormality  Abnormal ECG  No previous ECGs available  Confirmed by Liliya Sales ([id number]) on 2/11/2025 7:37:20 AM    Referred By: AAAREFERRAL SELF           Confirmed By: Liliya Sales                                  Imaging Results              X-Ray Chest 1 View (Final result)  Result time 02/10/25 14:49:52      Final result by Florin Xie MD (02/10/25 14:49:52)                   Impression:      No radiographic evidence of pneumonia or other source of cough/shortness of breath, noting that early/mild viral pneumonia or nonspecific bronchitis may be radiographically occult.      Electronically signed by: Florin Xie MD  Date:    02/10/2025  Time:    14:49               Narrative:    EXAMINATION:  XR CHEST 1 VIEW    CLINICAL HISTORY:  Cough, unspecified    TECHNIQUE:  Single frontal view of the chest was performed.    COMPARISON:  Is only from outside facility CTA chest 07/14/2023 and chest radiograph 05/10/2017    FINDINGS:  The lungs are well expanded and clear, with normal appearance of pulmonary vasculature and no pleural effusion or pneumothorax.    The cardiac silhouette is normal in size. The hilar and mediastinal contours are within normal limits for age.    No acute osseous process seen.  PA and lateral views can be obtained.                                    X-Rays:   Independently Interpreted Readings:   Chest X-Ray: No acute abnormalities.     Medications   LIDOcaine 5 % patch 1 patch (1 patch Transdermal Patch Removed  2/11/25 0135)   sodium chloride 0.9% flush 10 mL (has no administration in time range)   0.9% NaCl infusion ( Intravenous Stopped 2/10/25 2036)   dextrose 5 % and 0.45 % NaCl infusion ( Intravenous Verify Only 2/11/25 0653)   dextrose 50% injection 25 g (has no administration in time range)   dextrose 50% injection 12.5 g (has no administration in time range)   ondansetron injection 4 mg (has no administration in time range)   acetaminophen tablet 650 mg (650 mg Oral Given 2/11/25 0912)   ondansetron injection 4 mg (has no administration in time range)   polyethylene glycol packet 17 g (0 g Oral Hold 2/11/25 0900)   HYDROcodone-acetaminophen 5-325 mg per tablet 1 tablet (1 tablet Oral Given 2/10/25 2331)   labetaloL injection 10 mg (10 mg Intravenous Given 2/10/25 1713)   carvediloL tablet 6.25 mg (6.25 mg Oral Given 2/11/25 0902)   amLODIPine tablet 5 mg (5 mg Oral Given 2/11/25 0902)   insulin glargine U-100 (Lantus) pen 8 Units (8 Units Subcutaneous Given 2/11/25 0903)   glucose chewable tablet 16 g (has no administration in time range)   glucose chewable tablet 24 g (has no administration in time range)   dextrose 50% injection 12.5 g (has no administration in time range)   dextrose 50% injection 25 g (has no administration in time range)   glucagon (human recombinant) injection 1 mg (has no administration in time range)   insulin aspart U-100 pen 0-5 Units (has no administration in time range)   potassium bicarbonate disintegrating tablet 25 mEq (25 mEq Oral Given 2/11/25 0902)   acetaminophen tablet 1,000 mg (1,000 mg Oral Given 2/10/25 1335)   sodium chloride 0.9% bolus 1,000 mL 1,000 mL ( Intravenous Stopped 2/10/25 1645)   potassium chloride SA CR tablet 40 mEq (40 mEq Oral Given 2/10/25 2319)     Medical Decision Making  Differential Diagnosis includes, but is not limited to:  ACS/MI, PE, aortic dissection, pneumothorax, cardiac tamponade, pericarditis/myocarditis, pneumonia, infection/abscess, lung mass,  trauma/fracture, costochondritis/pleurisy, MSK pain/contusion, GERD, biliary disease, pancreatitis, anemia      Amount and/or Complexity of Data Reviewed  Labs: ordered.  Radiology: ordered.    Risk  OTC drugs.  Prescription drug management.  Decision regarding hospitalization.  Risk Details: Patient found to be in DKA with elevated CBG and anion gap acidosis  BARBIE    Appears stable on reassessment. He states he has noticed he has been very thirsty and urinating frequently. He agrees to admission.   Started on IVF, insulin infusion per protocol  He will be admitted to [facility]/ ICU     Critical Care  Total time providing critical care: 30 minutes               ED Course as of 02/11/25 1006   Mon Feb 10, 2025   1351 EKG: Rate 77.  Normal sinus rhythm.  No STEMI.  No ectopy [RN]      ED Course User Index  [RN] Aldo Tomlin Jr., MD                           Clinical Impression:  Final diagnoses:  [R03.0] Elevated BP without diagnosis of hypertension  [R05.9] Cough  [R05.9] Cough - left chest wall pain after coughing  [E11.10] DKA (diabetic ketoacidosis)          ED Disposition Condition    Observation Stable               Portions of this note were dictated using voice recognition software and may contain dictation related errors in spelling/grammar/syntax not found on text review       Aldo Tomlin Jr., MD  02/11/25 1008

## 2025-02-10 NOTE — ED NOTES
Attempted to give report. Floor RN transporting another pt. Will call back. Lea, ICU Charge at bedside.

## 2025-02-11 LAB
ANION GAP SERPL CALC-SCNC: 14 MMOL/L (ref 8–16)
ANION GAP SERPL CALC-SCNC: 15 MMOL/L (ref 8–16)
ASCENDING AORTA: 2.92 CM
AV INDEX (PROSTH): 0.58
AV MEAN GRADIENT: 4 MMHG
AV PEAK GRADIENT: 8 MMHG
AV VALVE AREA BY VELOCITY RATIO: 2.4 CM²
AV VALVE AREA: 2.2 CM²
AV VELOCITY RATIO: 0.64
BACTERIA #/AREA URNS HPF: NORMAL /HPF
BASOPHILS # BLD AUTO: 0.03 K/UL (ref 0–0.2)
BASOPHILS NFR BLD: 0.4 % (ref 0–1.9)
BILIRUB UR QL STRIP: NEGATIVE
BSA FOR ECHO PROCEDURE: 2.75 M2
BUN SERPL-MCNC: 11 MG/DL (ref 6–20)
BUN SERPL-MCNC: 9 MG/DL (ref 6–20)
CALCIUM SERPL-MCNC: 8 MG/DL (ref 8.7–10.5)
CALCIUM SERPL-MCNC: 8.1 MG/DL (ref 8.7–10.5)
CALCIUM SERPL-MCNC: 8.1 MG/DL (ref 8.7–10.5)
CALCIUM SERPL-MCNC: 8.3 MG/DL (ref 8.7–10.5)
CHLORIDE SERPL-SCNC: 103 MMOL/L (ref 95–110)
CHLORIDE SERPL-SCNC: 104 MMOL/L (ref 95–110)
CHLORIDE SERPL-SCNC: 104 MMOL/L (ref 95–110)
CHLORIDE SERPL-SCNC: 99 MMOL/L (ref 95–110)
CLARITY UR: ABNORMAL
CO2 SERPL-SCNC: 15 MMOL/L (ref 23–29)
CO2 SERPL-SCNC: 16 MMOL/L (ref 23–29)
COLOR UR: YELLOW
CREAT SERPL-MCNC: 0.9 MG/DL (ref 0.5–1.4)
CREAT SERPL-MCNC: 1.4 MG/DL (ref 0.5–1.4)
CV ECHO LV RWT: 0.43 CM
DIFFERENTIAL METHOD BLD: ABNORMAL
DOP CALC AO PEAK VEL: 1.4 M/S
DOP CALC AO VTI: 25.9 CM
DOP CALC LVOT AREA: 3.8 CM2
DOP CALC LVOT DIAMETER: 2.2 CM
DOP CALC LVOT PEAK VEL: 0.9 M/S
DOP CALC LVOT STROKE VOLUME: 57 CM3
DOP CALCLVOT PEAK VEL VTI: 15 CM
E WAVE DECELERATION TIME: 133 MSEC
E/A RATIO: 0.9
E/E' RATIO: 7 M/S
ECHO LV POSTERIOR WALL: 0.9 CM (ref 0.6–1.1)
EOSINOPHIL # BLD AUTO: 0.2 K/UL (ref 0–0.5)
EOSINOPHIL NFR BLD: 2.4 % (ref 0–8)
ERYTHROCYTE [DISTWIDTH] IN BLOOD BY AUTOMATED COUNT: 12.8 % (ref 11.5–14.5)
EST. GFR  (NO RACE VARIABLE): >60 ML/MIN/1.73 M^2
FRACTIONAL SHORTENING: 23.8 % (ref 28–44)
GLUCOSE SERPL-MCNC: 156 MG/DL (ref 70–110)
GLUCOSE SERPL-MCNC: 165 MG/DL (ref 70–110)
GLUCOSE SERPL-MCNC: 176 MG/DL (ref 70–110)
GLUCOSE SERPL-MCNC: 435 MG/DL (ref 70–110)
GLUCOSE UR QL STRIP: ABNORMAL
HCT VFR BLD AUTO: 42.1 % (ref 40–54)
HGB BLD-MCNC: 14.6 G/DL (ref 14–18)
HGB UR QL STRIP: NEGATIVE
HYALINE CASTS #/AREA URNS LPF: 0 /LPF
IMM GRANULOCYTES # BLD AUTO: 0.04 K/UL (ref 0–0.04)
IMM GRANULOCYTES NFR BLD AUTO: 0.5 % (ref 0–0.5)
INTERVENTRICULAR SEPTUM: 0.9 CM (ref 0.6–1.1)
IVRT: 134 MSEC
KETONES UR QL STRIP: ABNORMAL
LEFT ATRIUM AREA SYSTOLIC (APICAL 2 CHAMBER): 18.28 CM2
LEFT ATRIUM AREA SYSTOLIC (APICAL 4 CHAMBER): 18.26 CM2
LEFT ATRIUM VOLUME INDEX MOD: 17 ML/M2
LEFT ATRIUM VOLUME MOD: 46 ML
LEFT INTERNAL DIMENSION IN SYSTOLE: 3.2 CM (ref 2.1–4)
LEFT VENTRICLE DIASTOLIC VOLUME INDEX: 29.56 ML/M2
LEFT VENTRICLE DIASTOLIC VOLUME: 78.33 ML
LEFT VENTRICLE END SYSTOLIC VOLUME APICAL 2 CHAMBER: 44.08 ML
LEFT VENTRICLE END SYSTOLIC VOLUME APICAL 4 CHAMBER: 45.43 ML
LEFT VENTRICLE MASS INDEX: 44.8 G/M2
LEFT VENTRICLE SYSTOLIC VOLUME INDEX: 15.4 ML/M2
LEFT VENTRICLE SYSTOLIC VOLUME: 40.77 ML
LEFT VENTRICULAR INTERNAL DIMENSION IN DIASTOLE: 4.2 CM (ref 3.5–6)
LEFT VENTRICULAR MASS: 118.7 G
LEUKOCYTE ESTERASE UR QL STRIP: NEGATIVE
LV LATERAL E/E' RATIO: 7.6 M/S
LV SEPTAL E/E' RATIO: 6.8 M/S
LVED V (TEICH): 78.33 ML
LVES V (TEICH): 40.77 ML
LVOT MG: 1.95 MMHG
LVOT MV: 0.67 CM/S
LYMPHOCYTES # BLD AUTO: 3.2 K/UL (ref 1–4.8)
LYMPHOCYTES NFR BLD: 38.4 % (ref 18–48)
MCH RBC QN AUTO: 31.7 PG (ref 27–31)
MCHC RBC AUTO-ENTMCNC: 34.7 G/DL (ref 32–36)
MCV RBC AUTO: 91 FL (ref 82–98)
MICROSCOPIC COMMENT: NORMAL
MONOCYTES # BLD AUTO: 0.7 K/UL (ref 0.3–1)
MONOCYTES NFR BLD: 8.3 % (ref 4–15)
MV PEAK A VEL: 0.68 M/S
MV PEAK E VEL: 0.61 M/S
MV STENOSIS PRESSURE HALF TIME: 38.51 MS
MV VALVE AREA P 1/2 METHOD: 5.71 CM2
NEUTROPHILS # BLD AUTO: 4.1 K/UL (ref 1.8–7.7)
NEUTROPHILS NFR BLD: 50 % (ref 38–73)
NITRITE UR QL STRIP: NEGATIVE
NRBC BLD-RTO: 0 /100 WBC
OHS QRS DURATION: 94 MS
OHS QTC CALCULATION: 443 MS
PH UR STRIP: 6 [PH] (ref 5–8)
PHOSPHATE SERPL-MCNC: 3.2 MG/DL (ref 2.7–4.5)
PISA TR MAX VEL: 1.7 M/S
PLATELET # BLD AUTO: 184 K/UL (ref 150–450)
PMV BLD AUTO: 11.9 FL (ref 9.2–12.9)
POCT GLUCOSE: 157 MG/DL (ref 70–110)
POCT GLUCOSE: 159 MG/DL (ref 70–110)
POCT GLUCOSE: 159 MG/DL (ref 70–110)
POCT GLUCOSE: 168 MG/DL (ref 70–110)
POCT GLUCOSE: 172 MG/DL (ref 70–110)
POCT GLUCOSE: 178 MG/DL (ref 70–110)
POCT GLUCOSE: 182 MG/DL (ref 70–110)
POCT GLUCOSE: 183 MG/DL (ref 70–110)
POCT GLUCOSE: 296 MG/DL (ref 70–110)
POCT GLUCOSE: 304 MG/DL (ref 70–110)
POCT GLUCOSE: 404 MG/DL (ref 70–110)
POCT GLUCOSE: 407 MG/DL (ref 70–110)
POTASSIUM SERPL-SCNC: 3.2 MMOL/L (ref 3.5–5.1)
POTASSIUM SERPL-SCNC: 3.3 MMOL/L (ref 3.5–5.1)
POTASSIUM SERPL-SCNC: 3.5 MMOL/L (ref 3.5–5.1)
POTASSIUM SERPL-SCNC: 4.2 MMOL/L (ref 3.5–5.1)
PROT UR QL STRIP: ABNORMAL
RA PRESSURE ESTIMATED: 3 MMHG
RA VOL SYS: 46.42 ML
RBC # BLD AUTO: 4.61 M/UL (ref 4.6–6.2)
RBC #/AREA URNS HPF: 0 /HPF (ref 0–4)
RIGHT ATRIAL AREA: 16.5 CM2
RIGHT ATRIUM VOLUME AREA LENGTH APICAL 4 CHAMBER: 43.67 ML
RV TB RVSP: 5 MMHG
RV TISSUE DOPPLER FREE WALL SYSTOLIC VELOCITY 1 (APICAL 4 CHAMBER VIEW): 13.06 CM/S
SINUS: 3.28 CM
SODIUM SERPL-SCNC: 128 MMOL/L (ref 136–145)
SODIUM SERPL-SCNC: 134 MMOL/L (ref 136–145)
SP GR UR STRIP: 1.02 (ref 1–1.03)
STJ: 2.73 CM
TDI LATERAL: 0.08 M/S
TDI SEPTAL: 0.09 M/S
TDI: 0.09 M/S
TR MAX PG: 12 MMHG
TRICUSPID ANNULAR PLANE SYSTOLIC EXCURSION: 2.14 CM
TV REST PULMONARY ARTERY PRESSURE: 15 MMHG
URN SPEC COLLECT METH UR: ABNORMAL
UROBILINOGEN UR STRIP-ACNC: NEGATIVE EU/DL
WBC # BLD AUTO: 8.21 K/UL (ref 3.9–12.7)
WBC #/AREA URNS HPF: 4 /HPF (ref 0–5)
YEAST URNS QL MICRO: NORMAL
Z-SCORE OF LEFT VENTRICULAR DIMENSION IN END DIASTOLE: -16.38
Z-SCORE OF LEFT VENTRICULAR DIMENSION IN END SYSTOLE: -10.84

## 2025-02-11 PROCEDURE — 36415 COLL VENOUS BLD VENIPUNCTURE: CPT | Performed by: HOSPITALIST

## 2025-02-11 PROCEDURE — 96372 THER/PROPH/DIAG INJ SC/IM: CPT

## 2025-02-11 PROCEDURE — 25500020 PHARM REV CODE 255: Performed by: STUDENT IN AN ORGANIZED HEALTH CARE EDUCATION/TRAINING PROGRAM

## 2025-02-11 PROCEDURE — 80048 BASIC METABOLIC PNL TOTAL CA: CPT | Mod: 91 | Performed by: HOSPITALIST

## 2025-02-11 PROCEDURE — 63600175 PHARM REV CODE 636 W HCPCS: Performed by: STUDENT IN AN ORGANIZED HEALTH CARE EDUCATION/TRAINING PROGRAM

## 2025-02-11 PROCEDURE — 63600175 PHARM REV CODE 636 W HCPCS: Performed by: HOSPITALIST

## 2025-02-11 PROCEDURE — 81000 URINALYSIS NONAUTO W/SCOPE: CPT | Performed by: HOSPITALIST

## 2025-02-11 PROCEDURE — 63600175 PHARM REV CODE 636 W HCPCS

## 2025-02-11 PROCEDURE — 94761 N-INVAS EAR/PLS OXIMETRY MLT: CPT

## 2025-02-11 PROCEDURE — 25000003 PHARM REV CODE 250

## 2025-02-11 PROCEDURE — 80048 BASIC METABOLIC PNL TOTAL CA: CPT | Performed by: HOSPITALIST

## 2025-02-11 PROCEDURE — 25000003 PHARM REV CODE 250: Performed by: EMERGENCY MEDICINE

## 2025-02-11 PROCEDURE — 96372 THER/PROPH/DIAG INJ SC/IM: CPT | Performed by: HOSPITALIST

## 2025-02-11 PROCEDURE — 84100 ASSAY OF PHOSPHORUS: CPT | Performed by: HOSPITALIST

## 2025-02-11 PROCEDURE — 11000001 HC ACUTE MED/SURG PRIVATE ROOM

## 2025-02-11 PROCEDURE — 85025 COMPLETE CBC W/AUTO DIFF WBC: CPT | Performed by: HOSPITALIST

## 2025-02-11 PROCEDURE — 96372 THER/PROPH/DIAG INJ SC/IM: CPT | Performed by: STUDENT IN AN ORGANIZED HEALTH CARE EDUCATION/TRAINING PROGRAM

## 2025-02-11 PROCEDURE — 25000003 PHARM REV CODE 250: Performed by: STUDENT IN AN ORGANIZED HEALTH CARE EDUCATION/TRAINING PROGRAM

## 2025-02-11 PROCEDURE — 80048 BASIC METABOLIC PNL TOTAL CA: CPT | Mod: 91 | Performed by: STUDENT IN AN ORGANIZED HEALTH CARE EDUCATION/TRAINING PROGRAM

## 2025-02-11 PROCEDURE — S5010 5% DEXTROSE AND 0.45% SALINE: HCPCS | Performed by: STUDENT IN AN ORGANIZED HEALTH CARE EDUCATION/TRAINING PROGRAM

## 2025-02-11 PROCEDURE — 36415 COLL VENOUS BLD VENIPUNCTURE: CPT | Performed by: STUDENT IN AN ORGANIZED HEALTH CARE EDUCATION/TRAINING PROGRAM

## 2025-02-11 RX ORDER — INSULIN ASPART 100 [IU]/ML
10 INJECTION, SOLUTION INTRAVENOUS; SUBCUTANEOUS
Status: DISCONTINUED | OUTPATIENT
Start: 2025-02-11 | End: 2025-02-12

## 2025-02-11 RX ORDER — ENOXAPARIN SODIUM 100 MG/ML
40 INJECTION SUBCUTANEOUS EVERY 24 HOURS
Status: DISCONTINUED | OUTPATIENT
Start: 2025-02-11 | End: 2025-02-13 | Stop reason: HOSPADM

## 2025-02-11 RX ADMIN — CARVEDILOL 6.25 MG: 6.25 TABLET, FILM COATED ORAL at 09:02

## 2025-02-11 RX ADMIN — HUMAN ALBUMIN MICROSPHERES AND PERFLUTREN 0.11 MG: 10; .22 INJECTION, SOLUTION INTRAVENOUS at 07:02

## 2025-02-11 RX ADMIN — HYDROCODONE BITARTRATE AND ACETAMINOPHEN 1 TABLET: 5; 325 TABLET ORAL at 11:02

## 2025-02-11 RX ADMIN — INSULIN ASPART 4 UNITS: 100 INJECTION, SOLUTION INTRAVENOUS; SUBCUTANEOUS at 04:02

## 2025-02-11 RX ADMIN — DEXTROSE AND SODIUM CHLORIDE 125 ML/HR: 5; 450 INJECTION, SOLUTION INTRAVENOUS at 03:02

## 2025-02-11 RX ADMIN — POTASSIUM BICARBONATE 25 MEQ: 978 TABLET, EFFERVESCENT ORAL at 05:02

## 2025-02-11 RX ADMIN — ENOXAPARIN SODIUM 40 MG: 40 INJECTION SUBCUTANEOUS at 12:02

## 2025-02-11 RX ADMIN — INSULIN GLARGINE 8 UNITS: 100 INJECTION, SOLUTION SUBCUTANEOUS at 09:02

## 2025-02-11 RX ADMIN — INSULIN ASPART 10 UNITS: 100 INJECTION, SOLUTION INTRAVENOUS; SUBCUTANEOUS at 12:02

## 2025-02-11 RX ADMIN — CARVEDILOL 6.25 MG: 6.25 TABLET, FILM COATED ORAL at 08:02

## 2025-02-11 RX ADMIN — INSULIN ASPART 1 UNITS: 100 INJECTION, SOLUTION INTRAVENOUS; SUBCUTANEOUS at 08:02

## 2025-02-11 RX ADMIN — INSULIN ASPART 5 UNITS: 100 INJECTION, SOLUTION INTRAVENOUS; SUBCUTANEOUS at 11:02

## 2025-02-11 RX ADMIN — LIDOCAINE 1 PATCH: 50 PATCH CUTANEOUS at 02:02

## 2025-02-11 RX ADMIN — AMLODIPINE BESYLATE 5 MG: 5 TABLET ORAL at 09:02

## 2025-02-11 RX ADMIN — ACETAMINOPHEN 650 MG: 325 TABLET ORAL at 09:02

## 2025-02-11 RX ADMIN — INSULIN ASPART 10 UNITS: 100 INJECTION, SOLUTION INTRAVENOUS; SUBCUTANEOUS at 04:02

## 2025-02-11 RX ADMIN — POTASSIUM BICARBONATE 25 MEQ: 978 TABLET, EFFERVESCENT ORAL at 11:02

## 2025-02-11 RX ADMIN — POTASSIUM BICARBONATE 25 MEQ: 978 TABLET, EFFERVESCENT ORAL at 09:02

## 2025-02-11 NOTE — PROGRESS NOTES
[facility] Medicine  Progress Note    Patient Name: Girish Haley  MRN: [id number]  Patient Class: OP- Observation   Admission Date: 2/10/2025  Length of Stay: 0 days  Attending Physician: Jamie Newby MD  Primary Care Provider: Rosey, Primary Doctor        Subjective     Principal Problem:DKA (diabetic ketoacidosis)        HPI:  Girish Haley is a 50-year-old male with has not seen a doctor in years, who presents with left-sided pain.    Patient reports that he awoke earlier this morning, coughed and heard a pop on his left lower abdomen and hip region.  He says the pain has worsened with movement.  He says this has happened before.  He has not seen a physician in years.  He works in construction like conditions.  He also reports symptoms of polyuria and increased appetite.  He presents for further evaluation.    Triage vitals significant for elevated blood pressures.  Physical examination significant for tenderness to palpation in the left lower abdominal quadrant and hip area.  Review of systems significant for abdominal/hip pain, polyuria, increased thirst.    CBC was fairly unremarkable.  CMP significant for hypoglycemia to the 700s, pseudohyponatremia, CO2 less than 10, BARBIE.  Hemoglobin A1c of 14.  Flu and influenza negative.  Beta hydroxybutyrate was elevated.    Chest x-ray without signs of pneumonia.    EKG with normal sinus rhythm.    Patient meets criteria for DKA and admitted for further management.      Overview/Hospital Course:  No notes on file    Interval History:     ANAEON  Reports gas and abdominal bloating  Has an appetite  No f/c/n/v  No other complaints    Review of Systems  Objective:     Vital Signs (Most Recent):  Temp: 97.6 °F (36.4 °C) (02/11/25 1115)  Pulse: 64 (02/11/25 1030)  Resp: (!) 21 (02/11/25 1030)  BP: (!) 145/79 (02/11/25 1030)  SpO2: 96 % (02/11/25 1030) Vital Signs (24h Range):  Temp:  [97.6 °F (36.4 °C)-98.5 °F (36.9 °C)] 97.6 °F (36.4 °C)  Pulse:   [53-75] 64  Resp:  [12-31] 21  SpO2:  [92 %-99 %] 96 %  BP: (115-214)/() 145/79     Weight: 132 kg (291 lb 0.1 oz)  Body mass index is 37.36 kg/m².    Intake/Output Summary (Last 24 hours) at 2/11/2025 1133  Last data filed at 2/11/2025 0653  Gross per 24 hour   Intake 2792.86 ml   Output 725 ml   Net 2067.86 ml         Physical Exam  Constitutional:       General: He is not in acute distress.     Appearance: Normal appearance. He is not ill-appearing or toxic-appearing.   HENT:      Mouth/Throat:      Mouth: Mucous membranes are moist.      Pharynx: Oropharynx is clear.   Eyes:      Extraocular Movements: Extraocular movements intact.      Conjunctiva/sclera: Conjunctivae normal.   Cardiovascular:      Rate and Rhythm: Normal rate and regular rhythm.   Pulmonary:      Effort: Pulmonary effort is normal. No respiratory distress.      Breath sounds: Normal breath sounds. No wheezing.   Abdominal:      General: There is distension.      Palpations: Abdomen is soft.      Tenderness: There is no abdominal tenderness. There is no guarding.   Musculoskeletal:         General: Normal range of motion.      Right lower leg: No edema.      Left lower leg: No edema.   Skin:     General: Skin is warm and dry.   Neurological:      General: No focal deficit present.      Mental Status: He is alert and oriented to person, place, and time.   Psychiatric:         Mood and Affect: Mood normal.         Behavior: Behavior normal.             Significant Labs: All pertinent labs within the past 24 hours have been reviewed.    Significant Imaging: I have reviewed all pertinent imaging results/findings within the past 24 hours.    Assessment and Plan     * DKA (diabetic ketoacidosis)  Patient's FSGs are controlled on current medication regimen.  Last A1c reviewed-   Lab Results   Component Value Date    HGBA1C 14.0 (H) 02/10/2025     Most recent fingerstick glucose reviewed-   Recent Labs   Lab 02/11/25  0535 02/11/25  0631  02/11/25  0741 02/11/25  1127   POCTGLUCOSE 182* 157* 172* 407*       Current correctional scale   insulin drip  Maintain anti-hyperglycemic dose as follows-   Antihyperglycemics (From admission, onward)      Start     Stop Route Frequency Ordered    02/10/25 2314  insulin aspart U-100 pen 0-5 Units         -- SubQ Before meals & nightly PRN 02/10/25 2214    02/10/25 2215  insulin glargine U-100 (Lantus) pen 8 Units         -- SubQ Daily 02/10/25 2213          Hold Oral hypoglycemics while patient is in the hospital.    Currently on SSI and long acting insulin  Patient will need a glucometer and supplies, insulin pens, insulin pen needles, outpatient diabetes care and education referral, and follow up for diabetes management.  Will need to establish with PCP    Hypokalemia  Patient's most recent potassium results are listed below.   Recent Labs     02/11/25  0033 02/11/25  0342 02/11/25  0858   K 3.3* 3.2* 3.5       Plan  - Replete potassium per protocol  - Monitor potassium Daily  - Patient's hypokalemia is improving    BARBIE (acute kidney injury)  BARBIE is likely due to pre-renal azotemia due to intravascular volume depletion. Baseline creatinine is  1.0 . Most recent creatinine and eGFR are listed below.  Recent Labs     02/11/25  0033 02/11/25 0342 02/11/25  0858   CREATININE 0.9 0.9 0.9   EGFRNORACEVR >60 >60 >60        Plan  - BARBIE is resolved  - Avoid nephrotoxins and renally dose meds for GFR listed above  - Monitor urine output, serial BMP, and adjust therapy as needed  - likely BARBIE from polyuria from DKA    Hypertension  Patient's blood pressure range in the last 24 hours was: BP  Min: 115/57  Max: 214/110.The patient's inpatient anti-hypertensive regimen is listed below:  Current Antihypertensives  labetaloL injection 10 mg, Every 2 hours PRN, Intravenous  carvediloL tablet 6.25 mg, 2 times daily, Oral  amLODIPine tablet 5 mg, Daily, Oral    Plan  - BP is uncontrolled, will adjust as follows:  Start Coreg,  started amlodipine, start losartan  - we will need to see PCP and will be given these medications at discharge      VTE Risk Mitigation (From admission, onward)           Ordered     enoxaparin injection 40 mg  Every 24 hours         02/11/25 1044     Place sequential compression device  Until discontinued         02/10/25 2257     IP VTE HIGH RISK PATIENT  Once         02/10/25 1507     Place REED hose  Until discontinued         02/10/25 1507     Place sequential compression device  Until discontinued         02/10/25 1507                    Discharge Planning   ALEX:      Code Status: Full Code   Medical Readiness for Discharge Date:                Critical care time spent on the evaluation and treatment of severe organ dysfunction, review of pertinent labs and imaging studies, discussions with consulting providers and discussions with patient/family: 30 minutes.            Jamie Newby MD  Department of Hospital Medicine   [place] - Intensive Care

## 2025-02-11 NOTE — ASSESSMENT & PLAN NOTE
BARBIE is likely due to pre-renal azotemia due to intravascular volume depletion. Baseline creatinine is  1.0 . Most recent creatinine and eGFR are listed below.  Recent Labs     02/10/25  1726 02/10/25  1902 02/10/25  2054   CREATININE 1.1 1.0 0.9   EGFRNORACEVR >60 >60 >60      Plan  - BARBIE is resolved  - Avoid nephrotoxins and renally dose meds for GFR listed above  - Monitor urine output, serial BMP, and adjust therapy as needed  - likely BARBIE from polyuria from DKA

## 2025-02-11 NOTE — CONSULTS
Nutrition-Related Diabetes Education      Time Spent:  RD remote    Learners: Patient     Current HbA1c:  Lab Results   Component Value Date    HGBA1C 14.0 (H) 02/10/2025         Is patient aware of their A1c and their goal A1c?       ___X____ yes    Home diabetes medication(s):  Current Outpatient Medications   Medication Instructions    acetaminophen (TYLENOL) 500 mg, As needed (PRN)    aspirin (ECOTRIN) 81 mg, As needed (PRN)    aspirin-acetaminophen-caffeine 250-250-65 mg (EXCEDRIN MIGRAINE) 250-250-65 mg per tablet 1 tablet, Every 6 hours PRN    ibuprofen (ADVIL,MOTRIN) 200 mg, As needed (PRN)         Nutrition Education with handouts:   + Clinical Reference attachments to d/c documents  Diabetes Education Handouts       Comments:   Patient currently admitted with DKA with PMH of DM, in ICU and on diabetic oral diet.   RD unable to reach patient via phone.  Diabetic educations to be provided at follow up visits. Patient with prior diabetic educations.  Labs reviewed.  NKFA.  LBM: 2/10/25.  I/O since admit: +2068ml.  RD to continue to monitor po intake and encourage importance of compliance to diabetic diet restrictions    Patient Active Problem List   Diagnosis    DKA (diabetic ketoacidosis)    Hypertension    BARBIE (acute kidney injury)    Hypokalemia     Past Medical History:   Diagnosis Date    Hypertension 2/10/2025     BMP  Lab Results   Component Value Date     (L) 02/11/2025    K 3.2 (L) 02/11/2025     02/11/2025    CO2 15 (L) 02/11/2025    BUN 9 02/11/2025    CREATININE 0.9 02/11/2025    CALCIUM 8.0 (L) 02/11/2025    ANIONGAP 15 02/11/2025    EGFRNORACEVR >60 02/11/2025     Lab Results   Component Value Date    HGBA1C 14.0 (H) 02/10/2025     Glucose   Date Value Ref Range Status   02/11/2025 165 (H) 70 - 110 mg/dL Final     Lab Results   Component Value Date    CALCIUM 8.0 (L) 02/11/2025    PHOS 3.2 02/11/2025       Scheduled Meds:   amLODIPine  5 mg Oral Daily    carvediloL  6.25 mg Oral BID     insulin glargine U-100  8 Units Subcutaneous Daily    LIDOcaine  1 patch Transdermal Q24H    polyethylene glycol  17 g Oral Daily    potassium bicarbonate  25 mEq Oral Q6H     Continuous Infusions:   0.9% NaCl  125 mL/hr Intravenous Continuous   Stopped at 02/10/25 2036    D5 and 0.45% NaCl  125 mL/hr Intravenous Continuous  mL/hr at 02/11/25 0653 Rate Verify at 02/11/25 0653     PRN Meds:.  Current Facility-Administered Medications:     acetaminophen, 650 mg, Oral, Q4H PRN    D5 and 0.45% NaCl, 125 mL/hr, Intravenous, Continuous PRN    dextrose 50%, 12.5 g, Intravenous, PRN    dextrose 50%, 12.5 g, Intravenous, PRN    dextrose 50%, 25 g, Intravenous, PRN    dextrose 50%, 25 g, Intravenous, PRN    glucagon (human recombinant), 1 mg, Intramuscular, PRN    glucose, 16 g, Oral, PRN    glucose, 24 g, Oral, PRN    HYDROcodone-acetaminophen, 1 tablet, Oral, Q8H PRN    insulin aspart U-100, 0-5 Units, Subcutaneous, QID (AC + HS) PRN    labetalol, 10 mg, Intravenous, Q2H PRN    ondansetron, 4 mg, Intravenous, Q6H PRN    ondansetron, 4 mg, Intravenous, Q6H PRN    sodium chloride 0.9%, 10 mL, Intravenous, PRN          Barriers to Learning: ICU     Follow up: yes     Please consult as needed.  Thank you,  Marianna Caba, MS, RDN, LDN

## 2025-02-11 NOTE — HPI
Girish Haley is a 50-year-old male with has not seen a doctor in years, who presents with left-sided pain.    Patient reports that he awoke earlier this morning, coughed and heard a pop on his left lower abdomen and hip region.  He says the pain has worsened with movement.  He says this has happened before.  He has not seen a physician in years.  He works in construction like conditions.  He also reports symptoms of polyuria and increased appetite.  He presents for further evaluation.    Triage vitals significant for elevated blood pressures.  Physical examination significant for tenderness to palpation in the left lower abdominal quadrant and hip area.  Review of systems significant for abdominal/hip pain, polyuria, increased thirst.    CBC was fairly unremarkable.  CMP significant for hypoglycemia to the 700s, pseudohyponatremia, CO2 less than 10, BARBIE.  Hemoglobin A1c of 14.  Flu and influenza negative.  Beta hydroxybutyrate was elevated.    Chest x-ray without signs of pneumonia.    EKG with normal sinus rhythm.    Patient meets criteria for DKA and admitted for further management.

## 2025-02-11 NOTE — ASSESSMENT & PLAN NOTE
Patient's FSGs are controlled on current medication regimen.  Last A1c reviewed-   Lab Results   Component Value Date    HGBA1C 14.0 (H) 02/10/2025     Most recent fingerstick glucose reviewed-   Recent Labs   Lab 02/10/25  1831 02/10/25  1935 02/10/25  2029 02/10/25  2133   POCTGLUCOSE 255* 200* 175* 170*     Current correctional scale   insulin drip  Maintain anti-hyperglycemic dose as follows-   Antihyperglycemics (From admission, onward)      Start     Stop Route Frequency Ordered    02/10/25 2314  insulin aspart U-100 pen 0-5 Units         -- SubQ Before meals & nightly PRN 02/10/25 2214    02/10/25 2215  insulin glargine U-100 (Lantus) pen 8 Units         -- SubQ Daily 02/10/25 2213          Hold Oral hypoglycemics while patient is in the hospital.

## 2025-02-11 NOTE — PROGRESS NOTES
Consult Note  [facility]    Pulm/Critical Care Attending: Arturo Cisneros  Resident: Rickie Reynolds  Admit Date: 2/10/2025  Today's Date: 02/11/2025  Reason for Consult:  DKA    SUBJECTIVE:     HPI:  Girish Haley is a 50-year-old male with has not seen a doctor in years, who presents with left-sided pain.     Patient reports that he awoke earlier this morning, coughed and heard a pop on his left lower abdomen and hip region.  He says the pain has worsened with movement.  He says this has happened before.  He has not seen a physician in years.  He works in construction like conditions.  He also reports symptoms of polyuria and increased appetite.  He presents for further evaluation.     Triage vitals significant for elevated blood pressures.  Physical examination significant for tenderness to palpation in the left lower abdominal quadrant and hip area.  Review of systems significant for abdominal/hip pain, polyuria, increased thirst.     CBC was fairly unremarkable.  CMP significant for hypoglycemia to the 700s, pseudohyponatremia, CO2 less than 10, BARBIE.  Hemoglobin A1c of 14.  Flu and influenza negative.  Beta hydroxybutyrate was elevated.     Chest x-ray without signs of pneumonia.     EKG with normal sinus rhythm.     Patient meets criteria for DKA and admitted for further management  Review of patient's allergies indicates:  No Known Allergies    Past Medical History:   Diagnosis Date    Hypertension 2/10/2025     Past Surgical History:   Procedure Laterality Date    Left leg surgery       No family history on file.  Social History     Tobacco Use    Smoking status: Light Smoker     Types: Cigarettes    Smokeless tobacco: Never   Substance Use Topics    Alcohol use: Not Currently    Drug use: Yes     Types: Marijuana       All medications reviewed.    ROS    OBJECTIVE:     Vital Signs Trends/Hx Reviewed  Vitals:    02/11/25 0530 02/11/25 0600 02/11/25 0630 02/11/25 0700   BP: 125/71  (!) 122/55 138/80 128/62   Pulse: (!) 57 (!) 58 (!) 53 (!) 53   Resp: 15 14 14 13   Temp:       TempSrc:       SpO2: (!) 94% 96% (!) 93% (!) 93%   Weight:       Height:           Ventilator settings: N/A  Physical Exam:  General: NAD, cooperative & interactive.  HEENT: AT/NC, PERRL, EOMI, oral and nasal mucosa moist.   Neck: Supple without JVD or palpable LAD.   Cardiac: normal rate, regular rhythm, with no MRG with brisk cap refill and symmetric pulses in distal extremities.  Respiratory: Normal inspection. Symmetric chest rise. Normal palpation and percussion. Auscultation clear bilaterally. No increased work of breathing noted.   Abdomen: Soft, NT/ND. +BS. No hepatosplenomegaly.   Extremities: No edema.   Neuro: Grossly intact to brief exam. Oriented x3 with appropriate mood/affect to situation.       Laboratory:  Recent Labs   Lab 02/10/25  1504   PH 7.344*   PCO2 33.2*   PO2 51   HCO3 18.0*   POCSATURATED 84   BE -8*     Recent Labs   Lab 02/11/25  0342   WBC 8.21   RBC 4.61   HGB 14.6   HCT 42.1      MCV 91   MCH 31.7*   MCHC 34.7     Recent Labs   Lab 02/10/25  1456 02/10/25  1544 02/11/25  0342   *   < > 134*   K 4.6   < > 3.2*   CL 96   < > 104   CO2 10*   < > 15*   BUN 12   < > 9   CREATININE 1.5*   < > 0.9   CALCIUM 9.0   < > 8.0*   MG 2.1  2.2  --   --     < > = values in this interval not displayed.       Microbiology Data:   Microbiology Results (last 7 days)       Procedure Component Value Units Date/Time    Influenza A & B by Molecular [[id number]] Collected: 02/10/25 1318    Order Status: Completed Specimen: Nasopharyngeal Swab Updated: 02/10/25 1424     Influenza A, Molecular Negative     Influenza B, Molecular Negative     Flu A & B Source Nasal swab             Chest Imaging:   No new imaging.     Infusions:     0.9% NaCl  125 mL/hr Intravenous Continuous   Stopped at 02/10/25 2036    D5 and 0.45% NaCl  125 mL/hr Intravenous Continuous  mL/hr at 02/11/25 0653 Rate Verify at  02/11/25 0653    insulin regular 1 units/mL infusion orderable (DKA)  0-0.2 Units/kg/hr (Dosing Weight) Intravenous Continuous 6.6 mL/hr at 02/11/25 0742 0.05 Units/kg/hr at 02/11/25 0742       Scheduled Medications:    amLODIPine  5 mg Oral Daily    carvediloL  6.25 mg Oral BID    insulin glargine U-100  8 Units Subcutaneous Daily    LIDOcaine  1 patch Transdermal Q24H    polyethylene glycol  17 g Oral Daily       PRN Medications:     Current Facility-Administered Medications:     acetaminophen, 650 mg, Oral, Q4H PRN    D5 and 0.45% NaCl, 125 mL/hr, Intravenous, Continuous PRN    dextrose 50%, 12.5 g, Intravenous, PRN    dextrose 50%, 12.5 g, Intravenous, PRN    dextrose 50%, 25 g, Intravenous, PRN    dextrose 50%, 25 g, Intravenous, PRN    glucagon (human recombinant), 1 mg, Intramuscular, PRN    glucose, 16 g, Oral, PRN    glucose, 24 g, Oral, PRN    HYDROcodone-acetaminophen, 1 tablet, Oral, Q8H PRN    insulin aspart U-100, 0-5 Units, Subcutaneous, QID (AC + HS) PRN    labetalol, 10 mg, Intravenous, Q2H PRN    ondansetron, 4 mg, Intravenous, Q6H PRN    ondansetron, 4 mg, Intravenous, Q6H PRN    sodium chloride 0.9%, 10 mL, Intravenous, PRN    Assessment & Plan:   Patient Active Problem List   Diagnosis    DKA (diabetic ketoacidosis)    Hypertension    BARBIE (acute kidney injury)    Hypokalemia       ASSESSMENT & RECOMMENDATIONS       Neuro/Psych  {icuneuropsych:77441}  No acute issues at this time  Continue to monitor    CV  Most recent BP: 128/62  Initially hypertensive in ED with max 214/110; asymptomatic  Given PRN Labetolol IV 10 mg q 2 h  for SBP>160  Scheduled Carvedilol 6.25 mg BID; Amlodipine 5 mg daily  Will recommend continuing current regime and continue to monitor         Pulm  #Cough   Patient reports that he awoke earlier this morning, coughed and heard a pop on his left lower abdomen and hip region.  Influenza A/B negative    FEN/GI  {icugi:72330}  F -  mls/hr  E - Na 134 K 3.5 Anion Gap 15  Glucose 176  N - Diet: Diabetic  Fluid restriction   Keep Mg 2, K 4  Zofran PRN for N/V    RENAL  Strict I&Os  Avoid renal toxic meds  BUN/Cr: 9/0.9, baseline 1.0  Continue to monitor renal status and urine output     Heme  H/H 14.6/42.1; stable  WBC 8.21  DVT prophylaxis: {icudvtprophylaxis:44449}    Endo  #DKA  Patient with significantly elevated BG, acidosis, anion gap, evidence of ketones  Insulin Drip initiated and stopped in AM due to hypokalemia  Insulin Glargine 8 units  SSI  HgbA1C:--  TSH:--  Continue to monitor hypokalemia  Will replete potassium and restart insulin drip when able    ID  No known issues at this time.   Cxs are NGTD.   Continue ABX.  Urinalysis: ----  Blood culture ordered  urine culture ordered      ICU Checklist  Feeding: diabetic diet  Analgesia: N/A  Sedation: N/A  Thrombo PPX:   Head of Bed: >30 degrees  Ulcer (Stress) PPX:  Glucose: goal 140-180  SBT/SAT: N/A  Bowel Regimen:  Indwelling catheter/Lines/Invasive devices: PIV 20G left forearm; PIV 20 G Right antecubital; PIV 20 G right hand  De-escalation ABX: N/A      Code Status: Full      Dispo  -        Thank you for allowing us to participate in the care of this patient. We will N. Please contact me with questions.        Rickie Reynolds MD  [facility]

## 2025-02-11 NOTE — ASSESSMENT & PLAN NOTE
BARBIE is likely due to pre-renal azotemia due to intravascular volume depletion. Baseline creatinine is  1.0 . Most recent creatinine and eGFR are listed below.  Recent Labs     02/11/25  0033 02/11/25  0342 02/11/25  0858   CREATININE 0.9 0.9 0.9   EGFRNORACEVR >60 >60 >60        Plan  - BARBIE is resolved  - Avoid nephrotoxins and renally dose meds for GFR listed above  - Monitor urine output, serial BMP, and adjust therapy as needed  - likely BARBIE from polyuria from DKA

## 2025-02-11 NOTE — CONSULTS
Patient in with left flank pain and hyperglycemia.  Serum blood sugar was 710.  A1C taken and was 14.0%.  Patient newly diagnosed person with diabetes.  Patient does have family history of diabetes.  Mom and Dad both have diabetes and mom is on insulin.   Verbal/written education (Diabetes Management Guide) done with patient.  Education included; what is diabetes, what an A1C level is, A1C goal of <7.0%, current A1C is 14.0%, glucose monitoring (how to) and should monitor 4 times/day (with meals/bedtime), hyper/hypoglycemia signs & symptoms/treatment, carbohydrate counting diet which includes how to read a nutrition label, phone apps to see how many carbs are in something, etc., portion size, counting 90 grams of carbs per meal and 15-30 grams of carbs for snacks (1-2 per day if wanted), including physical activity 5 days/week, long-term complications of uncontrolled diabetes, follow up with doctor every 3 months for diabetes management, daily foot care, and yearly eye exams.  Medication education included oral hypoglycemics and insulin (short & long-acting).  Insulin injection via pen method went over with patient and patient did a return demonstration satisfactorily.  Instructed nursing staff to let patient do own injections starting today with them for further education.  Recommend follow up with PCP and Outpatient Diabetes Care & , glucometer, and medications delivered to bedside prior to discharge.  Phone number provided to patient if he has any questions.

## 2025-02-11 NOTE — ASSESSMENT & PLAN NOTE
Patient's FSGs are controlled on current medication regimen.  Last A1c reviewed-   Lab Results   Component Value Date    HGBA1C 14.0 (H) 02/10/2025     Most recent fingerstick glucose reviewed-   Recent Labs   Lab 02/11/25  0535 02/11/25  0631 02/11/25  0741 02/11/25  1127   POCTGLUCOSE 182* 157* 172* 407*       Current correctional scale   insulin drip  Maintain anti-hyperglycemic dose as follows-   Antihyperglycemics (From admission, onward)      Start     Stop Route Frequency Ordered    02/10/25 2314  insulin aspart U-100 pen 0-5 Units         -- SubQ Before meals & nightly PRN 02/10/25 2214    02/10/25 2215  insulin glargine U-100 (Lantus) pen 8 Units         -- SubQ Daily 02/10/25 2213          Hold Oral hypoglycemics while patient is in the hospital.    Currently on SSI and long acting insulin  Patient will need a glucometer and supplies, insulin pens, insulin pen needles, outpatient diabetes care and education referral, and follow up for diabetes management.  Will need to establish with PCP

## 2025-02-11 NOTE — EICU
eICU Intervention    Notified of K 3.4  Transitioning to basal insulin  Also noted to have cough productive of phlegm that has dark spots    CXR no radiographic evidence of pneumonia  On further questioning patient was a smoker who claims to have quit a month prior    Ordered K 40 meqs PO x 1  Likely smoker's cough. Will defer to bedside rounding team if further work up is necessary.  Discussed with BSRN

## 2025-02-11 NOTE — ASSESSMENT & PLAN NOTE
Patient's most recent potassium results are listed below.   Recent Labs     02/11/25  0033 02/11/25  0342 02/11/25  0858   K 3.3* 3.2* 3.5       Plan  - Replete potassium per protocol  - Monitor potassium Daily  - Patient's hypokalemia is improving

## 2025-02-11 NOTE — SUBJECTIVE & OBJECTIVE
Interval History:     TABATHA  Reports gas and abdominal bloating  Has an appetite  No f/c/n/v  No other complaints    Review of Systems  Objective:     Vital Signs (Most Recent):  Temp: 97.6 °F (36.4 °C) (02/11/25 1115)  Pulse: 64 (02/11/25 1030)  Resp: (!) 21 (02/11/25 1030)  BP: (!) 145/79 (02/11/25 1030)  SpO2: 96 % (02/11/25 1030) Vital Signs (24h Range):  Temp:  [97.6 °F (36.4 °C)-98.5 °F (36.9 °C)] 97.6 °F (36.4 °C)  Pulse:  [53-75] 64  Resp:  [12-31] 21  SpO2:  [92 %-99 %] 96 %  BP: (115-214)/() 145/79     Weight: 132 kg (291 lb 0.1 oz)  Body mass index is 37.36 kg/m².    Intake/Output Summary (Last 24 hours) at 2/11/2025 1133  Last data filed at 2/11/2025 0653  Gross per 24 hour   Intake 2792.86 ml   Output 725 ml   Net 2067.86 ml         Physical Exam  Constitutional:       General: He is not in acute distress.     Appearance: Normal appearance. He is not ill-appearing or toxic-appearing.   HENT:      Mouth/Throat:      Mouth: Mucous membranes are moist.      Pharynx: Oropharynx is clear.   Eyes:      Extraocular Movements: Extraocular movements intact.      Conjunctiva/sclera: Conjunctivae normal.   Cardiovascular:      Rate and Rhythm: Normal rate and regular rhythm.   Pulmonary:      Effort: Pulmonary effort is normal. No respiratory distress.      Breath sounds: Normal breath sounds. No wheezing.   Abdominal:      General: There is distension.      Palpations: Abdomen is soft.      Tenderness: There is no abdominal tenderness. There is no guarding.   Musculoskeletal:         General: Normal range of motion.      Right lower leg: No edema.      Left lower leg: No edema.   Skin:     General: Skin is warm and dry.   Neurological:      General: No focal deficit present.      Mental Status: He is alert and oriented to person, place, and time.   Psychiatric:         Mood and Affect: Mood normal.         Behavior: Behavior normal.             Significant Labs: All pertinent labs within the past 24 hours  have been reviewed.    Significant Imaging: I have reviewed all pertinent imaging results/findings within the past 24 hours.

## 2025-02-11 NOTE — PLAN OF CARE
Problem: Adult Inpatient Plan of Care  Goal: Plan of Care Review  Outcome: Progressing  Goal: Patient-Specific Goal (Individualized)  Outcome: Progressing  Goal: Absence of Hospital-Acquired Illness or Injury  Outcome: Progressing  Goal: Optimal Comfort and Wellbeing  Outcome: Progressing  Goal: Readiness for Transition of Care  Outcome: Progressing     Patient Aox4. Vitals stable. Scheduled medications given as ordered. Insulin gtt infusing. Trending BMP's q4 (gap close but waiting for co2 to come into range). All alarms audible and active. Safety parameters are in place.

## 2025-02-11 NOTE — H&P
"  [facility]  History & Physical    Patient Name: Girish Haley  MRN: [id number]  Patient Class: OP- Observation  Admission Date: 2/10/2025  Attending Physician: Kee Jacobs,*   Primary Care Provider: Rosey, Primary Doctor         Patient information was obtained from patient and ER records.     Subjective:     Principal Problem:DKA (diabetic ketoacidosis)    Chief Complaint:   Chief Complaint   Patient presents with    Flank Pain     Patient reports left side pain since yesterday. Patient states he woke up this morning, coughed, and heard a "pop" when the pain worsened. Tender to palpation. Denies urinary s/s.        HPI: Girish Haley is a 50-year-old male with has not seen a doctor in years, who presents with left-sided pain.    Patient reports that he awoke earlier this morning, coughed and heard a pop on his left lower abdomen and hip region.  He says the pain has worsened with movement.  He says this has happened before.  He has not seen a physician in years.  He works in construction like conditions.  He also reports symptoms of polyuria and increased appetite.  He presents for further evaluation.    Triage vitals significant for elevated blood pressures.  Physical examination significant for tenderness to palpation in the left lower abdominal quadrant and hip area.  Review of systems significant for abdominal/hip pain, polyuria, increased thirst.    CBC was fairly unremarkable.  CMP significant for hypoglycemia to the 700s, pseudohyponatremia, CO2 less than 10, BARBIE.  Hemoglobin A1c of 14.  Flu and influenza negative.  Beta hydroxybutyrate was elevated.    Chest x-ray without signs of pneumonia.    EKG with normal sinus rhythm.    Patient meets criteria for DKA and admitted for further management.      No past medical history on file.    Past Surgical History:   Procedure Laterality Date    Left leg surgery         Review of patient's allergies indicates:  No " Known Allergies    No current facility-administered medications on file prior to encounter.     Current Outpatient Medications on File Prior to Encounter   Medication Sig    acetaminophen (TYLENOL) 500 MG tablet Take 500 mg by mouth as needed for Pain.    aspirin (ECOTRIN) 81 MG EC tablet Take 81 mg by mouth as needed for Pain.    aspirin-acetaminophen-caffeine 250-250-65 mg (EXCEDRIN MIGRAINE) 250-250-65 mg per tablet Take 1 tablet by mouth every 6 (six) hours as needed for Pain.    ibuprofen (ADVIL,MOTRIN) 200 MG tablet Take 200 mg by mouth as needed for Pain.     Family History    None       Tobacco Use    Smoking status: Light Smoker     Types: Cigarettes    Smokeless tobacco: Never   Substance and Sexual Activity    Alcohol use: Not Currently    Drug use: Yes     Types: Marijuana    Sexual activity: Yes     Review of Systems   Constitutional:  Negative for activity change and appetite change.   HENT:  Negative for ear discharge and ear pain.    Eyes:  Negative for pain and redness.   Respiratory:  Negative for chest tightness.    Cardiovascular:  Negative for chest pain.   Gastrointestinal:  Negative for anal bleeding and blood in stool.   Endocrine: Negative for polydipsia and polyphagia.   Genitourinary:  Negative for frequency and genital sores.   Musculoskeletal:  Negative for gait problem and joint swelling.   Skin:  Negative for pallor and rash.   Allergic/Immunologic: Negative for environmental allergies and food allergies.   Neurological:  Negative for seizures and numbness.   Hematological:  Negative for adenopathy. Does not bruise/bleed easily.   Psychiatric/Behavioral:  Negative for confusion and decreased concentration.      Objective:     Vital Signs (Most Recent):  Temp: 98.4 °F (36.9 °C) (02/10/25 1930)  Pulse: 62 (02/10/25 2145)  Resp: (!) 22 (02/10/25 2145)  BP: 136/85 (02/10/25 2145)  SpO2: 96 % (02/10/25 2145) Vital Signs (24h Range):  Temp:  [97.6 °F (36.4 °C)-98.5 °F (36.9 °C)] 98.4 °F  (36.9 °C)  Pulse:  [58-75] 62  Resp:  [17-31] 22  SpO2:  [94 %-99 %] 96 %  BP: (134-214)/() 136/85     Weight: 132 kg (291 lb 0.1 oz)  Body mass index is 37.36 kg/m².     Physical Exam  Constitutional:       General: He is not in acute distress.     Appearance: Normal appearance. He is not ill-appearing.   HENT:      Head: Normocephalic and atraumatic.      Right Ear: There is no impacted cerumen.      Left Ear: Tympanic membrane normal. There is no impacted cerumen.      Nose: No congestion or rhinorrhea.      Mouth/Throat:      Pharynx: No oropharyngeal exudate or posterior oropharyngeal erythema.   Eyes:      General:         Right eye: No discharge.         Left eye: No discharge.   Cardiovascular:      Rate and Rhythm: Tachycardia present.      Heart sounds: No murmur heard.     No gallop.   Abdominal:      Tenderness: There is abdominal tenderness. There is no guarding or rebound.      Hernia: No hernia is present.   Musculoskeletal:         General: No deformity.      Cervical back: No rigidity.      Right lower leg: No edema.   Lymphadenopathy:      Cervical: No cervical adenopathy.   Skin:     Findings: No bruising or lesion.   Neurological:      Mental Status: He is alert.      Motor: No weakness.      Gait: Gait normal.   Psychiatric:         Mood and Affect: Mood normal.         Behavior: Behavior normal.                Significant Labs: All pertinent labs within the past 24 hours have been reviewed.  BMP:   Recent Labs   Lab 02/10/25  1456 02/10/25  1544 02/10/25  2054   *   < > 162*   *   < > 136   K 4.6   < > 3.4*   CL 96   < > 105   CO2 10*   < > 15*   BUN 12   < > 10   CREATININE 1.5*   < > 0.9   CALCIUM 9.0   < > 8.5*   MG 2.1  2.2  --   --     < > = values in this interval not displayed.     CBC:   Recent Labs   Lab 02/10/25  1332 02/10/25  1504 02/10/25  2116   WBC 9.95  --  9.42   HGB 16.6  --  15.5   HCT 48.7 51 44.3     --  195       Significant Imaging: I have  reviewed all pertinent imaging results/findings within the past 24 hours.  I have reviewed and interpreted all pertinent imaging results/findings within the past 24 hours.  Assessment/Plan:     * DKA (diabetic ketoacidosis)  Patient's FSGs are controlled on current medication regimen.  Last A1c reviewed-   Lab Results   Component Value Date    HGBA1C 14.0 (H) 02/10/2025     Most recent fingerstick glucose reviewed-   Recent Labs   Lab 02/10/25  1831 02/10/25  1935 02/10/25  2029 02/10/25  2133   POCTGLUCOSE 255* 200* 175* 170*     Current correctional scale   insulin drip  Maintain anti-hyperglycemic dose as follows-   Antihyperglycemics (From admission, onward)      Start     Stop Route Frequency Ordered    02/10/25 2314  insulin aspart U-100 pen 0-5 Units         -- SubQ Before meals & nightly PRN 02/10/25 2214    02/10/25 2215  insulin glargine U-100 (Lantus) pen 8 Units         -- SubQ Daily 02/10/25 2213          Hold Oral hypoglycemics while patient is in the hospital.    Hypokalemia  Patient's most recent potassium results are listed below.   Recent Labs     02/10/25  1726 02/10/25  1902 02/10/25  2054   K 3.7 3.3* 3.4*     Plan  - Replete potassium per protocol  - Monitor potassium Daily  - Patient's hypokalemia is improving    BARBIE (acute kidney injury)  BARBIE is likely due to pre-renal azotemia due to intravascular volume depletion. Baseline creatinine is  1.0 . Most recent creatinine and eGFR are listed below.  Recent Labs     02/10/25  1726 02/10/25  1902 02/10/25  2054   CREATININE 1.1 1.0 0.9   EGFRNORACEVR >60 >60 >60      Plan  - BARBIE is resolved  - Avoid nephrotoxins and renally dose meds for GFR listed above  - Monitor urine output, serial BMP, and adjust therapy as needed  - likely BARBIE from polyuria from DKA    Hypertension  Patient's blood pressure range in the last 24 hours was: BP  Min: 134/76  Max: 214/110.The patient's inpatient anti-hypertensive regimen is listed below:  Current  Antihypertensives  labetaloL injection 10 mg, Every 2 hours PRN, Intravenous  carvediloL tablet 6.25 mg, 2 times daily, Oral  amLODIPine tablet 5 mg, Daily, Oral    Plan  - BP is uncontrolled, will adjust as follows:  Start Coreg, started amlodipine, start losartan  - we will need to see PCP and will be given these medications at discharge      VTE Risk Mitigation (From admission, onward)           Ordered     IP VTE HIGH RISK PATIENT  Once         02/10/25 1507     Place REED hose  Until discontinued         02/10/25 1507     Place sequential compression device  Until discontinued         02/10/25 1507                  Critical care time spent on the evaluation and treatment of severe organ dysfunction, review of pertinent labs and imaging studies, discussions with consulting providers and discussions with patient/family: 35 minutes.     On 02/10/2025, patient should be placed in hospital observation services under my care.             Scout France MD  Department of Hospital Medicine  [facility] Int[facility]

## 2025-02-11 NOTE — PLAN OF CARE
02/11/25 0930   Rounds   Attendance Provider;Nurse    Discharge Plan A Home with family   Why the patient remains in the hospital Requires continued medical care       0930  CM was informed by Dr Newby that the pt is not medically stable to discharge but will stepdown from the ICU today.       Flo - Intensive Care  Initial Discharge Assessment       Primary Care Provider: No, Primary Doctor    Admission Diagnosis: Cough [R05.9]  DKA (diabetic ketoacidosis) [E11.10]  Elevated BP without diagnosis of hypertension [R03.0]    Admission Date: 2/10/2025  Expected Discharge Date: 2/12/2025    Consult: DM education & nut    Payor: MEDICAID / Plan[place] [employer] / Product Type: Managed Medicaid /     Extended Emergency Contact Information  Primary Emergency Contact: Sergey Haley  Address: [address]           [place] [address]  Mobile Phone: [phone]  Relation: Daughter  Secondary Emergency Contact: Jose Haley  Mobile Phone: [phone]  Relation: Brother  Preferred language: English   needed? No    Discharge Plan A: (P) Home with family  Discharge Plan B: (P) Home Health      [facility] [address] [place] [address] [address] [facility]  [address] [facility] [address]  Phone: [phone] Fax: [phone]      Initial Assessment (most recent)       Adult Discharge Assessment - 02/11/25 1125          Discharge Assessment    Assessment Type Discharge Planning Assessment (P)      Confirmed/corrected address, phone number and insurance Yes (P)      Confirmed Demographics Correct on Facesheet (P)      Source of Information patient (P)      Communicated ALEX with patient/caregiver Yes (P)      People in Home child(thi), adult;grandchild(thi) (P)    adult daughterSergey ([phone]), & 3 y.o. GD    Do you expect to return to your current living situation? Yes (P)      Do you have help at home or someone to help you manage your care at home? Yes (P)      Prior  to hospitilization cognitive status: Alert/Oriented (P)      Current cognitive status: Alert/Oriented (P)      Equipment Currently Used at Home none (P)      Readmission within 30 days? No (P)      Patient currently being followed by outpatient case management? No (P)      Do you currently have service(s) that help you manage your care at home? No (P)      Do you take prescription medications? No (P)      Do you have prescription coverage? Yes (P)      Do you have any problems affording any of your prescribed medications? TBD (P)      Is the patient taking medications as prescribed? -- (P)    NA    How do you get to doctors appointments? family or friend will provide;car, drives self (P)      Are you on dialysis? No (P)      Do you take coumadin? No (P)      Discharge Plan A Home with family (P)      Discharge Plan B Home Health (P)      DME Needed Upon Discharge  glucometer (P)      Discharge Plan discussed with: Patient (P)         Physical Activity    On average, how many days per week do you engage in moderate to strenuous exercise (like a brisk walk)? 0 days (P)      On average, how many minutes do you engage in exercise at this level? 0 min (P)         Financial Resource Strain    How hard is it for you to pay for the very basics like food, housing, medical care, and heating? Not hard at all (P)         Housing Stability    In the last 12 months, was there a time when you were not able to pay the mortgage or rent on time? No (P)      At any time in the past 12 months, were you homeless or living in a shelter (including now)? No (P)         Transportation Needs    Has the lack of transportation kept you from medical appointments, meetings, work or from getting things needed for daily living? No (P)         Food Insecurity    Within the past 12 months, you worried that your food would run out before you got the money to buy more. Never true (P)      Within the past 12 months, the food you bought just didn't last  and you didn't have money to get more. Never true (P)         Stress    Do you feel stress - tense, restless, nervous, or anxious, or unable to sleep at night because your mind is troubled all the time - these days? Not at all (P)         Social Isolation    How often do you feel lonely or isolated from those around you?  Never (P)         Alcohol Use    Q1: How often do you have a drink containing alcohol? Never (P)      Q2: How many drinks containing alcohol do you have on a typical day when you are drinking? Patient does not drink (P)      Q3: How often do you have six or more drinks on one occasion? Never (P)         Utilities    In the past 12 months has the electric, gas, oil, or water company threatened to shut off services in your home? No (P)         Health Literacy    How often do you need to have someone help you when you read instructions, pamphlets, or other written material from your doctor or pharmacy? Rarely (P)    pt wears readers                     1125  Patient resting quietly in bed when CM rounded. No family present. Patient was admitted with DKA & is being followed by DM educator and nut. Blood glucose was 638 at time of admit & was 165 this AM.     Patient lives with his adult daughter, Sergey Haley, & 3 y.o. GS, is independent of all ADLs, & denied the need for assistance with transportation at time of discharge.     Pt verbalized understanding that he will need to take insulin following discharge, stated that the DM educator provided teaching this AM, & that he was able to administer his insulin today. Pt stated that he does not take any medication & does not have a PCP.     CM informed the pt that he will stepdown from the ICU today & potentially dc home tomorrow. Pt verbalized understanding & agreement.     CM updated patient's whiteboard with CM name & contact information.     1707  Message sent to the schedulers requesting a hospital follow up appointment with Dr Maegan Espinosa.  Awaiting response.      Will continue to follow.

## 2025-02-11 NOTE — ASSESSMENT & PLAN NOTE
Patient's blood pressure range in the last 24 hours was: BP  Min: 134/76  Max: 214/110.The patient's inpatient anti-hypertensive regimen is listed below:  Current Antihypertensives  labetaloL injection 10 mg, Every 2 hours PRN, Intravenous  carvediloL tablet 6.25 mg, 2 times daily, Oral  amLODIPine tablet 5 mg, Daily, Oral    Plan  - BP is uncontrolled, will adjust as follows:  Start Coreg, started amlodipine, start losartan  - we will need to see PCP and will be given these medications at discharge

## 2025-02-11 NOTE — ASSESSMENT & PLAN NOTE
Patient's most recent potassium results are listed below.   Recent Labs     02/10/25  1726 02/10/25  1902 02/10/25  2054   K 3.7 3.3* 3.4*     Plan  - Replete potassium per protocol  - Monitor potassium Daily  - Patient's hypokalemia is improving

## 2025-02-11 NOTE — ASSESSMENT & PLAN NOTE
Patient's blood pressure range in the last 24 hours was: BP  Min: 115/57  Max: 214/110.The patient's inpatient anti-hypertensive regimen is listed below:  Current Antihypertensives  labetaloL injection 10 mg, Every 2 hours PRN, Intravenous  carvediloL tablet 6.25 mg, 2 times daily, Oral  amLODIPine tablet 5 mg, Daily, Oral    Plan  - BP is uncontrolled, will adjust as follows:  Start Coreg, started amlodipine, start losartan  - we will need to see PCP and will be given these medications at discharge

## 2025-02-12 ENCOUNTER — CLINICAL SUPPORT (OUTPATIENT)
Dept: SMOKING CESSATION | Facility: CLINIC | Age: 50
End: 2025-02-12

## 2025-02-12 DIAGNOSIS — F17.210 CIGARETTE SMOKER: Primary | ICD-10-CM

## 2025-02-12 PROBLEM — E78.5 HYPERLIPIDEMIA: Status: ACTIVE | Noted: 2025-02-12

## 2025-02-12 PROBLEM — F17.200 TOBACCO DEPENDENCY: Status: ACTIVE | Noted: 2025-02-12

## 2025-02-12 LAB
ANION GAP SERPL CALC-SCNC: 13 MMOL/L (ref 8–16)
BASOPHILS # BLD AUTO: 0.02 K/UL (ref 0–0.2)
BASOPHILS # BLD AUTO: 0.02 K/UL (ref 0–0.2)
BASOPHILS NFR BLD: 0.3 % (ref 0–1.9)
BASOPHILS NFR BLD: 0.3 % (ref 0–1.9)
BUN SERPL-MCNC: 9 MG/DL (ref 6–20)
CALCIUM SERPL-MCNC: 8.2 MG/DL (ref 8.7–10.5)
CHLORIDE SERPL-SCNC: 102 MMOL/L (ref 95–110)
CHOLEST SERPL-MCNC: 290 MG/DL (ref 120–199)
CHOLEST/HDLC SERPL: 19.3 {RATIO} (ref 2–5)
CO2 SERPL-SCNC: 15 MMOL/L (ref 23–29)
CREAT SERPL-MCNC: 1 MG/DL (ref 0.5–1.4)
DIFFERENTIAL METHOD BLD: ABNORMAL
DIFFERENTIAL METHOD BLD: ABNORMAL
EOSINOPHIL # BLD AUTO: 0.2 K/UL (ref 0–0.5)
EOSINOPHIL # BLD AUTO: 0.2 K/UL (ref 0–0.5)
EOSINOPHIL NFR BLD: 2.5 % (ref 0–8)
EOSINOPHIL NFR BLD: 2.5 % (ref 0–8)
ERYTHROCYTE [DISTWIDTH] IN BLOOD BY AUTOMATED COUNT: 12.7 % (ref 11.5–14.5)
ERYTHROCYTE [DISTWIDTH] IN BLOOD BY AUTOMATED COUNT: 12.7 % (ref 11.5–14.5)
EST. GFR  (NO RACE VARIABLE): >60 ML/MIN/1.73 M^2
GLUCOSE SERPL-MCNC: 350 MG/DL (ref 70–110)
HCT VFR BLD AUTO: 40.1 % (ref 40–54)
HCT VFR BLD AUTO: 40.1 % (ref 40–54)
HDLC SERPL-MCNC: 15 MG/DL (ref 40–75)
HDLC SERPL: 5.2 % (ref 20–50)
HGB BLD-MCNC: 14.2 G/DL (ref 14–18)
HGB BLD-MCNC: 14.2 G/DL (ref 14–18)
IMM GRANULOCYTES # BLD AUTO: 0.03 K/UL (ref 0–0.04)
IMM GRANULOCYTES # BLD AUTO: 0.03 K/UL (ref 0–0.04)
IMM GRANULOCYTES NFR BLD AUTO: 0.5 % (ref 0–0.5)
IMM GRANULOCYTES NFR BLD AUTO: 0.5 % (ref 0–0.5)
LDLC SERPL CALC-MCNC: ABNORMAL MG/DL (ref 63–159)
LYMPHOCYTES # BLD AUTO: 2 K/UL (ref 1–4.8)
LYMPHOCYTES # BLD AUTO: 2 K/UL (ref 1–4.8)
LYMPHOCYTES NFR BLD: 31.5 % (ref 18–48)
LYMPHOCYTES NFR BLD: 31.5 % (ref 18–48)
MCH RBC QN AUTO: 32.9 PG (ref 27–31)
MCH RBC QN AUTO: 32.9 PG (ref 27–31)
MCHC RBC AUTO-ENTMCNC: 35.4 G/DL (ref 32–36)
MCHC RBC AUTO-ENTMCNC: 35.4 G/DL (ref 32–36)
MCV RBC AUTO: 93 FL (ref 82–98)
MCV RBC AUTO: 93 FL (ref 82–98)
MONOCYTES # BLD AUTO: 0.6 K/UL (ref 0.3–1)
MONOCYTES # BLD AUTO: 0.6 K/UL (ref 0.3–1)
MONOCYTES NFR BLD: 9.4 % (ref 4–15)
MONOCYTES NFR BLD: 9.4 % (ref 4–15)
NEUTROPHILS # BLD AUTO: 3.5 K/UL (ref 1.8–7.7)
NEUTROPHILS # BLD AUTO: 3.5 K/UL (ref 1.8–7.7)
NEUTROPHILS NFR BLD: 55.8 % (ref 38–73)
NEUTROPHILS NFR BLD: 55.8 % (ref 38–73)
NONHDLC SERPL-MCNC: 275 MG/DL
NRBC BLD-RTO: 0 /100 WBC
NRBC BLD-RTO: 0 /100 WBC
PHOSPHATE SERPL-MCNC: 2.6 MG/DL (ref 2.7–4.5)
PLATELET # BLD AUTO: 155 K/UL (ref 150–450)
PLATELET # BLD AUTO: 155 K/UL (ref 150–450)
PMV BLD AUTO: 11.8 FL (ref 9.2–12.9)
PMV BLD AUTO: 11.8 FL (ref 9.2–12.9)
POCT GLUCOSE: 287 MG/DL (ref 70–110)
POCT GLUCOSE: 292 MG/DL (ref 70–110)
POCT GLUCOSE: 317 MG/DL (ref 70–110)
POCT GLUCOSE: 345 MG/DL (ref 70–110)
POTASSIUM SERPL-SCNC: 4.2 MMOL/L (ref 3.5–5.1)
RBC # BLD AUTO: 4.32 M/UL (ref 4.6–6.2)
RBC # BLD AUTO: 4.32 M/UL (ref 4.6–6.2)
SODIUM SERPL-SCNC: 130 MMOL/L (ref 136–145)
TRIGL SERPL-MCNC: 796 MG/DL (ref 30–150)
WBC # BLD AUTO: 6.29 K/UL (ref 3.9–12.7)
WBC # BLD AUTO: 6.29 K/UL (ref 3.9–12.7)

## 2025-02-12 PROCEDURE — 84100 ASSAY OF PHOSPHORUS: CPT | Performed by: HOSPITALIST

## 2025-02-12 PROCEDURE — 25000003 PHARM REV CODE 250

## 2025-02-12 PROCEDURE — 25000003 PHARM REV CODE 250: Performed by: EMERGENCY MEDICINE

## 2025-02-12 PROCEDURE — 36415 COLL VENOUS BLD VENIPUNCTURE: CPT | Performed by: HOSPITALIST

## 2025-02-12 PROCEDURE — 63600175 PHARM REV CODE 636 W HCPCS

## 2025-02-12 PROCEDURE — 25000003 PHARM REV CODE 250: Performed by: STUDENT IN AN ORGANIZED HEALTH CARE EDUCATION/TRAINING PROGRAM

## 2025-02-12 PROCEDURE — 85025 COMPLETE CBC W/AUTO DIFF WBC: CPT | Performed by: HOSPITALIST

## 2025-02-12 PROCEDURE — 94761 N-INVAS EAR/PLS OXIMETRY MLT: CPT

## 2025-02-12 PROCEDURE — 11000001 HC ACUTE MED/SURG PRIVATE ROOM

## 2025-02-12 PROCEDURE — 80061 LIPID PANEL: CPT | Performed by: HOSPITALIST

## 2025-02-12 PROCEDURE — 99406 BEHAV CHNG SMOKING 3-10 MIN: CPT | Mod: ,,,

## 2025-02-12 PROCEDURE — 80048 BASIC METABOLIC PNL TOTAL CA: CPT | Performed by: STUDENT IN AN ORGANIZED HEALTH CARE EDUCATION/TRAINING PROGRAM

## 2025-02-12 RX ORDER — INSULIN GLARGINE 100 [IU]/ML
20 INJECTION, SOLUTION SUBCUTANEOUS DAILY
Status: DISCONTINUED | OUTPATIENT
Start: 2025-02-13 | End: 2025-02-13 | Stop reason: HOSPADM

## 2025-02-12 RX ORDER — INSULIN GLARGINE 100 [IU]/ML
15 INJECTION, SOLUTION SUBCUTANEOUS DAILY
Status: DISCONTINUED | OUTPATIENT
Start: 2025-02-12 | End: 2025-02-12

## 2025-02-12 RX ORDER — ATORVASTATIN CALCIUM 20 MG/1
40 TABLET, FILM COATED ORAL DAILY
Status: DISCONTINUED | OUTPATIENT
Start: 2025-02-13 | End: 2025-02-13 | Stop reason: HOSPADM

## 2025-02-12 RX ORDER — METFORMIN HYDROCHLORIDE 500 MG/1
500 TABLET ORAL 2 TIMES DAILY WITH MEALS
Qty: 180 TABLET | Refills: 3 | Status: CANCELLED | OUTPATIENT
Start: 2025-02-12 | End: 2026-02-12

## 2025-02-12 RX ORDER — INSULIN ASPART 100 [IU]/ML
12 INJECTION, SOLUTION INTRAVENOUS; SUBCUTANEOUS
Status: DISCONTINUED | OUTPATIENT
Start: 2025-02-12 | End: 2025-02-13

## 2025-02-12 RX ADMIN — INSULIN ASPART 3 UNITS: 100 INJECTION, SOLUTION INTRAVENOUS; SUBCUTANEOUS at 05:02

## 2025-02-12 RX ADMIN — POLYETHYLENE GLYCOL 3350 17 G: 17 POWDER, FOR SOLUTION ORAL at 08:02

## 2025-02-12 RX ADMIN — INSULIN ASPART 10 UNITS: 100 INJECTION, SOLUTION INTRAVENOUS; SUBCUTANEOUS at 07:02

## 2025-02-12 RX ADMIN — INSULIN ASPART 3 UNITS: 100 INJECTION, SOLUTION INTRAVENOUS; SUBCUTANEOUS at 12:02

## 2025-02-12 RX ADMIN — POTASSIUM BICARBONATE 25 MEQ: 978 TABLET, EFFERVESCENT ORAL at 05:02

## 2025-02-12 RX ADMIN — INSULIN ASPART 10 UNITS: 100 INJECTION, SOLUTION INTRAVENOUS; SUBCUTANEOUS at 12:02

## 2025-02-12 RX ADMIN — AMLODIPINE BESYLATE 5 MG: 5 TABLET ORAL at 08:02

## 2025-02-12 RX ADMIN — ENOXAPARIN SODIUM 40 MG: 40 INJECTION SUBCUTANEOUS at 05:02

## 2025-02-12 RX ADMIN — CARVEDILOL 6.25 MG: 6.25 TABLET, FILM COATED ORAL at 08:02

## 2025-02-12 RX ADMIN — LIDOCAINE 1 PATCH: 50 PATCH CUTANEOUS at 02:02

## 2025-02-12 RX ADMIN — INSULIN ASPART 2 UNITS: 100 INJECTION, SOLUTION INTRAVENOUS; SUBCUTANEOUS at 08:02

## 2025-02-12 RX ADMIN — INSULIN ASPART 12 UNITS: 100 INJECTION, SOLUTION INTRAVENOUS; SUBCUTANEOUS at 05:02

## 2025-02-12 RX ADMIN — INSULIN GLARGINE 15 UNITS: 100 INJECTION, SOLUTION SUBCUTANEOUS at 08:02

## 2025-02-12 NOTE — ASSESSMENT & PLAN NOTE
Patient's most recent potassium results are listed below.   Recent Labs     02/11/25  0858 02/11/25  1237 02/12/25  0830   K 3.5 4.2 4.2       Plan  - Replete potassium per protocol  - Monitor potassium Daily  - Patient's hypokalemia is improving

## 2025-02-12 NOTE — ASSESSMENT & PLAN NOTE
Patient's blood pressure range in the last 24 hours was: BP  Min: 107/55  Max: 180/122.The patient's inpatient anti-hypertensive regimen is listed below:  Current Antihypertensives  labetaloL injection 10 mg, Every 2 hours PRN, Intravenous  carvediloL tablet 6.25 mg, 2 times daily, Oral  amLODIPine tablet 5 mg, Daily, Oral    Plan  - BP is uncontrolled, will adjust as follows:  Start Coreg, started amlodipine, start losartan  - we will need to see PCP and will be given these medications at discharge

## 2025-02-12 NOTE — ASSESSMENT & PLAN NOTE
Patient's FSGs are controlled on current medication regimen.  Last A1c reviewed-   Lab Results   Component Value Date    HGBA1C 14.0 (H) 02/10/2025     Most recent fingerstick glucose reviewed-   Recent Labs   Lab 02/11/25  1632 02/11/25  2042 02/12/25  0614 02/12/25  1109   POCTGLUCOSE 304* 296* 345* 287*       Current correctional scale   insulin drip  Maintain anti-hyperglycemic dose as follows-   Antihyperglycemics (From admission, onward)      Start     Stop Route Frequency Ordered    02/13/25 0900  insulin glargine U-100 (Lantus) pen 20 Units         -- SubQ Daily 02/12/25 1322    02/12/25 1645  insulin aspart U-100 pen 12 Units         -- SubQ 3 times daily with meals 02/12/25 1322    02/10/25 2314  insulin aspart U-100 pen 0-5 Units         -- SubQ Before meals & nightly PRN 02/10/25 2214          Hold Oral hypoglycemics while patient is in the hospital.    Currently on SSI and long acting insulin - titrate up as needed  Patient will need a glucometer and supplies, insulin pens, insulin pen needles, outpatient diabetes care and education referral, and follow up for diabetes management.  Will need to establish with PCP

## 2025-02-12 NOTE — NURSING TRANSFER
Nursing Transfer Note      02/12/2025   12:20 AM    Nurse giving handoff: JENAE Corona  Nurse receiving handoff: JENAE Nguyễn    Reason patient is being transferred: Step down    Transfer To: 423    Transfer via wheelchair    Transfer with cardiac monitoring    Transported by JENAE Corona, JENAE Harrison    Transfer Vital Signs:  Blood Pressure: 127/67  Heart Rate: 72  O2: 98  Temperature: 98.5  Respirations: 26    Telemetry: Box Number [id number], Rate 72, Rhythm NSR , and Telemetry  N/A    Order for Tele Monitor? Yes    Additional Lines: N/A    Medicines sent: Insulin Aspart, Insulin Levemir    Any special needs or follow-up needed: DM Education    Patient belongings transferred with patient: Yes (cell phone)    Chart send with patient: Yes    Notified: Sergey Haley (daughter) per pt    Upon arrival to floor: cardiac monitor applied, patient oriented to room, call bell in reach, and bed in lowest position

## 2025-02-12 NOTE — ASSESSMENT & PLAN NOTE
Severe hyperlipidemia with triglycerides >796 and LDL uncalculated due to high triglycerides. High risk for pancreatitis and cardiovascular disease.    Dx:  Liver function tests to assess hepatic involvement    Tx:  High-intensity statin

## 2025-02-12 NOTE — PROGRESS NOTES
[facility]  Progress Note    Patient Name: Girish Haley  MRN: [id number]  Patient Class: IP- Inpatient   Admission Date: 2/10/2025  Length of Stay: 1 days  Attending Physician: Jamie Newby MD  Primary Care Provider: Rosey, Primary Doctor        Subjective     Principal Problem:DKA (diabetic ketoacidosis)        HPI:  Girish Haley is a 50-year-old male with has not seen a doctor in years, who presents with left-sided pain.    Patient reports that he awoke earlier this morning, coughed and heard a pop on his left lower abdomen and hip region.  He says the pain has worsened with movement.  He says this has happened before.  He has not seen a physician in years.  He works in construction like conditions.  He also reports symptoms of polyuria and increased appetite.  He presents for further evaluation.    Triage vitals significant for elevated blood pressures.  Physical examination significant for tenderness to palpation in the left lower abdominal quadrant and hip area.  Review of systems significant for abdominal/hip pain, polyuria, increased thirst.    CBC was fairly unremarkable.  CMP significant for hypoglycemia to the 700s, pseudohyponatremia, CO2 less than 10, BARBIE.  Hemoglobin A1c of 14.  Flu and influenza negative.  Beta hydroxybutyrate was elevated.    Chest x-ray without signs of pneumonia.    EKG with normal sinus rhythm.    Patient meets criteria for DKA and admitted for further management.      Overview/Hospital Course:  No notes on file    Interval History:     NAEON  Tolerating PO diet well  No f/c/n/v  No other complaints    Review of Systems  Objective:     Vital Signs (Most Recent):  Temp: 98.3 °F (36.8 °C) (02/12/25 1205)  Pulse: 66 (02/12/25 1411)  Resp: 20 (02/12/25 1205)  BP: (!) 144/87 (02/12/25 1205)  SpO2: 97 % (02/12/25 1205) Vital Signs (24h Range):  Temp:  [97.4 °F (36.3 °C)-98.6 °F (37 °C)] 98.3 °F (36.8 °C)  Pulse:  [60-79] 66  Resp:  [15-47] 20  SpO2:   [94 %-99 %] 97 %  BP: (107-180)/() 144/87     Weight: (!) 145.2 kg (320 lb)  Body mass index is 41.09 kg/m².    Intake/Output Summary (Last 24 hours) at 2/12/2025 1424  Last data filed at 2/12/2025 0424  Gross per 24 hour   Intake 1453.84 ml   Output 1900 ml   Net -446.16 ml         Physical Exam  Constitutional:       General: He is not in acute distress.     Appearance: Normal appearance. He is not ill-appearing or toxic-appearing.   HENT:      Mouth/Throat:      Mouth: Mucous membranes are moist.      Pharynx: Oropharynx is clear.   Eyes:      Extraocular Movements: Extraocular movements intact.      Conjunctiva/sclera: Conjunctivae normal.   Cardiovascular:      Rate and Rhythm: Normal rate and regular rhythm.   Pulmonary:      Effort: Pulmonary effort is normal. No respiratory distress.      Breath sounds: Normal breath sounds. No wheezing.   Abdominal:      General: There is distension.      Palpations: Abdomen is soft.      Tenderness: There is no abdominal tenderness. There is no guarding.   Musculoskeletal:         General: Normal range of motion.      Right lower leg: No edema.      Left lower leg: No edema.   Skin:     General: Skin is warm and dry.   Neurological:      General: No focal deficit present.      Mental Status: He is alert and oriented to person, place, and time.   Psychiatric:         Mood and Affect: Mood normal.         Behavior: Behavior normal.             Significant Labs: All pertinent labs within the past 24 hours have been reviewed.    Significant Imaging: I have reviewed all pertinent imaging results/findings within the past 24 hours.    Assessment and Plan     * DKA (diabetic ketoacidosis)  Patient's FSGs are controlled on current medication regimen.  Last A1c reviewed-   Lab Results   Component Value Date    HGBA1C 14.0 (H) 02/10/2025     Most recent fingerstick glucose reviewed-   Recent Labs   Lab 02/11/25  1632 02/11/25  2042 02/12/25  0614 02/12/25  1109   POCTGLUCOSE  304* 296* 345* 287*       Current correctional scale   insulin drip  Maintain anti-hyperglycemic dose as follows-   Antihyperglycemics (From admission, onward)      Start     Stop Route Frequency Ordered    02/13/25 0900  insulin glargine U-100 (Lantus) pen 20 Units         -- SubQ Daily 02/12/25 1322    02/12/25 1645  insulin aspart U-100 pen 12 Units         -- SubQ 3 times daily with meals 02/12/25 1322    02/10/25 2314  insulin aspart U-100 pen 0-5 Units         -- SubQ Before meals & nightly PRN 02/10/25 2214          Hold Oral hypoglycemics while patient is in the hospital.    Currently on SSI and long acting insulin - titrate up as needed  Patient will need a glucometer and supplies, insulin pens, insulin pen needles, outpatient diabetes care and education referral, and follow up for diabetes management.  Will need to establish with PCP    Hyperlipidemia  Severe hyperlipidemia with triglycerides >796 and LDL uncalculated due to high triglycerides. High risk for pancreatitis and cardiovascular disease.    Dx:  Liver function tests to assess hepatic involvement    Tx:  High-intensity statin    Hypokalemia  Patient's most recent potassium results are listed below.   Recent Labs     02/11/25  0858 02/11/25  1237 02/12/25  0830   K 3.5 4.2 4.2       Plan  - Replete potassium per protocol  - Monitor potassium Daily  - Patient's hypokalemia is improving    BARBIE (acute kidney injury)  BARBIE is likely due to pre-renal azotemia due to intravascular volume depletion. Baseline creatinine is  1.0 . Most recent creatinine and eGFR are listed below.  Recent Labs     02/11/25  0858 02/11/25  1237 02/12/25  0830   CREATININE 0.9 1.4 1.0   EGFRNORACEVR >60 >60 >60        Plan  - BARBIE is resolved  - Avoid nephrotoxins and renally dose meds for GFR listed above  - Monitor urine output, serial BMP, and adjust therapy as needed  - likely BARBIE from polyuria from DKA    Hypertension  Patient's blood pressure range in the last 24 hours  was: BP  Min: 107/55  Max: 180/122.The patient's inpatient anti-hypertensive regimen is listed below:  Current Antihypertensives  labetaloL injection 10 mg, Every 2 hours PRN, Intravenous  carvediloL tablet 6.25 mg, 2 times daily, Oral  amLODIPine tablet 5 mg, Daily, Oral    Plan  - BP is uncontrolled, will adjust as follows:  Start Coreg, started amlodipine, start losartan  - we will need to see PCP and will be given these medications at discharge      VTE Risk Mitigation (From admission, onward)           Ordered     enoxaparin injection 40 mg  Every 24 hours         02/11/25 1044     Place sequential compression device  Until discontinued         02/10/25 2257     IP VTE HIGH RISK PATIENT  Once         02/10/25 1507     Place REED hose  Until discontinued         02/10/25 1507     Place sequential compression device  Until discontinued         02/10/25 1507                    Discharge Planning   ALEX: 2/13/2025     Code Status: Full Code   Medical Readiness for Discharge Date:   Discharge Plan A: Home with family                        Jamie Newby MD  Department of Hospital Medicine   [facility]

## 2025-02-12 NOTE — PLAN OF CARE
Insulin gtt discontinued. Transfer orders in place, Diabetic education given to pt by educator. Pt remained on RA O2 sats >95%. No complaints of any pain or discomfort. Pt was able to demonstrate self administration insulin approprietly with supervision. Safety measures have been maintained. Questions have been encouraged and answered.  Problem: Adult Inpatient Plan of Care  Goal: Plan of Care Review  Outcome: Progressing  Goal: Patient-Specific Goal (Individualized)  Outcome: Progressing  Goal: Absence of Hospital-Acquired Illness or Injury  Outcome: Progressing  Goal: Optimal Comfort and Wellbeing  Outcome: Progressing  Goal: Readiness for Transition of Care  Outcome: Progressing     Problem: Bariatric Environmental Safety  Goal: Safety Maintained with Care  Outcome: Progressing     Problem: Diabetic Ketoacidosis  Goal: Optimal Coping  Outcome: Progressing  Goal: Fluid and Electrolyte Balance with Absence of Ketosis  Outcome: Progressing     Problem: Diabetes Comorbidity  Goal: Blood Glucose Level Within Targeted Range  Outcome: Progressing     Problem: Acute Kidney Injury/Impairment  Goal: Fluid and Electrolyte Balance  Outcome: Progressing  Goal: Improved Oral Intake  Outcome: Progressing  Goal: Effective Renal Function  Outcome: Progressing

## 2025-02-12 NOTE — ASSESSMENT & PLAN NOTE
BARBIE is likely due to pre-renal azotemia due to intravascular volume depletion. Baseline creatinine is  1.0 . Most recent creatinine and eGFR are listed below.  Recent Labs     02/11/25  0858 02/11/25  1237 02/12/25  0830   CREATININE 0.9 1.4 1.0   EGFRNORACEVR >60 >60 >60        Plan  - BARBIE is resolved  - Avoid nephrotoxins and renally dose meds for GFR listed above  - Monitor urine output, serial BMP, and adjust therapy as needed  - likely BARBIE from polyuria from DKA

## 2025-02-12 NOTE — PLAN OF CARE
02/12/25 0940   Rounds   Attendance Provider;Nurse    Discharge Plan A Home with family   Why the patient remains in the hospital Requires continued medical care       0940  CM was informed by Dr Newby that the pt might be medically stable to discharge home today if blood sugar improves this afternoon with adjustments made to the pt's insulin.     1005  Pt resting quietly in bed when CM rounded. No family present. Patient stepdown from ICU on 2/12/2025, was admitted with DKA, & continues to be followed by followed by DM educator and nut. Blood glucose was 345 this AM. CM informed pt of adjustments made to his insulin, possible dc this afternoon, & message sent requesting a new PCP at [facility]. Pt verbalized understanding & agreement.     1015  CM was informed by  Abigail of a hospfu appt scheduled for the patient with NP Cintia Chew ([facility]) on 2/17/2025 at 1600. Information added to the patient's discharge paperwork.       Will continue to follow.

## 2025-02-12 NOTE — SUBJECTIVE & OBJECTIVE
Interval History:     NAEON  Tolerating PO diet well  No f/c/n/v  No other complaints    Review of Systems  Objective:     Vital Signs (Most Recent):  Temp: 98.3 °F (36.8 °C) (02/12/25 1205)  Pulse: 66 (02/12/25 1411)  Resp: 20 (02/12/25 1205)  BP: (!) 144/87 (02/12/25 1205)  SpO2: 97 % (02/12/25 1205) Vital Signs (24h Range):  Temp:  [97.4 °F (36.3 °C)-98.6 °F (37 °C)] 98.3 °F (36.8 °C)  Pulse:  [60-79] 66  Resp:  [15-47] 20  SpO2:  [94 %-99 %] 97 %  BP: (107-180)/() 144/87     Weight: (!) 145.2 kg (320 lb)  Body mass index is 41.09 kg/m².    Intake/Output Summary (Last 24 hours) at 2/12/2025 1424  Last data filed at 2/12/2025 0424  Gross per 24 hour   Intake 1453.84 ml   Output 1900 ml   Net -446.16 ml         Physical Exam  Constitutional:       General: He is not in acute distress.     Appearance: Normal appearance. He is not ill-appearing or toxic-appearing.   HENT:      Mouth/Throat:      Mouth: Mucous membranes are moist.      Pharynx: Oropharynx is clear.   Eyes:      Extraocular Movements: Extraocular movements intact.      Conjunctiva/sclera: Conjunctivae normal.   Cardiovascular:      Rate and Rhythm: Normal rate and regular rhythm.   Pulmonary:      Effort: Pulmonary effort is normal. No respiratory distress.      Breath sounds: Normal breath sounds. No wheezing.   Abdominal:      General: There is distension.      Palpations: Abdomen is soft.      Tenderness: There is no abdominal tenderness. There is no guarding.   Musculoskeletal:         General: Normal range of motion.      Right lower leg: No edema.      Left lower leg: No edema.   Skin:     General: Skin is warm and dry.   Neurological:      General: No focal deficit present.      Mental Status: He is alert and oriented to person, place, and time.   Psychiatric:         Mood and Affect: Mood normal.         Behavior: Behavior normal.             Significant Labs: All pertinent labs within the past 24 hours have been reviewed.    Significant  Imaging: I have reviewed all pertinent imaging results/findings within the past 24 hours.

## 2025-02-13 ENCOUNTER — CLINICAL SUPPORT (OUTPATIENT)
Dept: SMOKING CESSATION | Facility: CLINIC | Age: 50
End: 2025-02-13

## 2025-02-13 VITALS
OXYGEN SATURATION: 98 % | SYSTOLIC BLOOD PRESSURE: 127 MMHG | TEMPERATURE: 98 F | WEIGHT: 315 LBS | BODY MASS INDEX: 40.43 KG/M2 | HEART RATE: 65 BPM | DIASTOLIC BLOOD PRESSURE: 71 MMHG | RESPIRATION RATE: 18 BRPM | HEIGHT: 74 IN

## 2025-02-13 DIAGNOSIS — F17.210 CIGARETTE SMOKER: Primary | ICD-10-CM

## 2025-02-13 LAB
ALBUMIN SERPL BCP-MCNC: 3.2 G/DL (ref 3.5–5.2)
ALP SERPL-CCNC: 96 U/L (ref 40–150)
ALT SERPL W/O P-5'-P-CCNC: 25 U/L (ref 10–44)
ANION GAP SERPL CALC-SCNC: 12 MMOL/L (ref 8–16)
AST SERPL-CCNC: 16 U/L (ref 10–40)
BASOPHILS # BLD AUTO: 0.02 K/UL (ref 0–0.2)
BASOPHILS # BLD AUTO: 0.02 K/UL (ref 0–0.2)
BASOPHILS NFR BLD: 0.3 % (ref 0–1.9)
BASOPHILS NFR BLD: 0.3 % (ref 0–1.9)
BILIRUB DIRECT SERPL-MCNC: 0.1 MG/DL (ref 0.1–0.3)
BILIRUB SERPL-MCNC: 0.3 MG/DL (ref 0.1–1)
BUN SERPL-MCNC: 11 MG/DL (ref 6–20)
CALCIUM SERPL-MCNC: 8.2 MG/DL (ref 8.7–10.5)
CHLORIDE SERPL-SCNC: 104 MMOL/L (ref 95–110)
CO2 SERPL-SCNC: 16 MMOL/L (ref 23–29)
CREAT SERPL-MCNC: 1 MG/DL (ref 0.5–1.4)
DIFFERENTIAL METHOD BLD: ABNORMAL
DIFFERENTIAL METHOD BLD: ABNORMAL
EOSINOPHIL # BLD AUTO: 0.1 K/UL (ref 0–0.5)
EOSINOPHIL # BLD AUTO: 0.1 K/UL (ref 0–0.5)
EOSINOPHIL NFR BLD: 2.1 % (ref 0–8)
EOSINOPHIL NFR BLD: 2.1 % (ref 0–8)
ERYTHROCYTE [DISTWIDTH] IN BLOOD BY AUTOMATED COUNT: 12.9 % (ref 11.5–14.5)
ERYTHROCYTE [DISTWIDTH] IN BLOOD BY AUTOMATED COUNT: 12.9 % (ref 11.5–14.5)
EST. GFR  (NO RACE VARIABLE): >60 ML/MIN/1.73 M^2
GLUCOSE SERPL-MCNC: 301 MG/DL (ref 70–110)
HCT VFR BLD AUTO: 39.4 % (ref 40–54)
HCT VFR BLD AUTO: 39.4 % (ref 40–54)
HGB BLD-MCNC: 13.5 G/DL (ref 14–18)
HGB BLD-MCNC: 13.5 G/DL (ref 14–18)
IMM GRANULOCYTES # BLD AUTO: 0.02 K/UL (ref 0–0.04)
IMM GRANULOCYTES # BLD AUTO: 0.02 K/UL (ref 0–0.04)
IMM GRANULOCYTES NFR BLD AUTO: 0.3 % (ref 0–0.5)
IMM GRANULOCYTES NFR BLD AUTO: 0.3 % (ref 0–0.5)
LYMPHOCYTES # BLD AUTO: 2.5 K/UL (ref 1–4.8)
LYMPHOCYTES # BLD AUTO: 2.5 K/UL (ref 1–4.8)
LYMPHOCYTES NFR BLD: 38.4 % (ref 18–48)
LYMPHOCYTES NFR BLD: 38.4 % (ref 18–48)
MCH RBC QN AUTO: 32.1 PG (ref 27–31)
MCH RBC QN AUTO: 32.1 PG (ref 27–31)
MCHC RBC AUTO-ENTMCNC: 34.3 G/DL (ref 32–36)
MCHC RBC AUTO-ENTMCNC: 34.3 G/DL (ref 32–36)
MCV RBC AUTO: 94 FL (ref 82–98)
MCV RBC AUTO: 94 FL (ref 82–98)
MONOCYTES # BLD AUTO: 0.7 K/UL (ref 0.3–1)
MONOCYTES # BLD AUTO: 0.7 K/UL (ref 0.3–1)
MONOCYTES NFR BLD: 10.5 % (ref 4–15)
MONOCYTES NFR BLD: 10.5 % (ref 4–15)
NEUTROPHILS # BLD AUTO: 3.2 K/UL (ref 1.8–7.7)
NEUTROPHILS # BLD AUTO: 3.2 K/UL (ref 1.8–7.7)
NEUTROPHILS NFR BLD: 48.4 % (ref 38–73)
NEUTROPHILS NFR BLD: 48.4 % (ref 38–73)
NRBC BLD-RTO: 0 /100 WBC
NRBC BLD-RTO: 0 /100 WBC
PHOSPHATE SERPL-MCNC: 3.3 MG/DL (ref 2.7–4.5)
PLATELET # BLD AUTO: 162 K/UL (ref 150–450)
PLATELET # BLD AUTO: 162 K/UL (ref 150–450)
PMV BLD AUTO: 11.8 FL (ref 9.2–12.9)
PMV BLD AUTO: 11.8 FL (ref 9.2–12.9)
POCT GLUCOSE: 288 MG/DL (ref 70–110)
POCT GLUCOSE: 316 MG/DL (ref 70–110)
POTASSIUM SERPL-SCNC: 4 MMOL/L (ref 3.5–5.1)
PROT SERPL-MCNC: 6.2 G/DL (ref 6–8.4)
RBC # BLD AUTO: 4.2 M/UL (ref 4.6–6.2)
RBC # BLD AUTO: 4.2 M/UL (ref 4.6–6.2)
SODIUM SERPL-SCNC: 132 MMOL/L (ref 136–145)
WBC # BLD AUTO: 6.58 K/UL (ref 3.9–12.7)
WBC # BLD AUTO: 6.58 K/UL (ref 3.9–12.7)

## 2025-02-13 PROCEDURE — 84100 ASSAY OF PHOSPHORUS: CPT | Performed by: HOSPITALIST

## 2025-02-13 PROCEDURE — 25000003 PHARM REV CODE 250: Performed by: EMERGENCY MEDICINE

## 2025-02-13 PROCEDURE — 85025 COMPLETE CBC W/AUTO DIFF WBC: CPT | Performed by: HOSPITALIST

## 2025-02-13 PROCEDURE — 94761 N-INVAS EAR/PLS OXIMETRY MLT: CPT

## 2025-02-13 PROCEDURE — 80048 BASIC METABOLIC PNL TOTAL CA: CPT | Performed by: STUDENT IN AN ORGANIZED HEALTH CARE EDUCATION/TRAINING PROGRAM

## 2025-02-13 PROCEDURE — 25000003 PHARM REV CODE 250: Performed by: STUDENT IN AN ORGANIZED HEALTH CARE EDUCATION/TRAINING PROGRAM

## 2025-02-13 PROCEDURE — 80076 HEPATIC FUNCTION PANEL: CPT | Performed by: HOSPITALIST

## 2025-02-13 PROCEDURE — 36415 COLL VENOUS BLD VENIPUNCTURE: CPT | Performed by: HOSPITALIST

## 2025-02-13 PROCEDURE — 25000003 PHARM REV CODE 250: Performed by: HOSPITALIST

## 2025-02-13 RX ORDER — SYRINGE AND NEEDLE,INSULIN,1ML 31GX15/64"
1 SYRINGE, EMPTY DISPOSABLE MISCELLANEOUS
Qty: 90 EACH | Refills: 0 | Status: SHIPPED | OUTPATIENT
Start: 2025-02-13 | End: 2026-02-13

## 2025-02-13 RX ORDER — SYRINGE,SAFETY WITH NEEDLE,1ML 25GX1"
1 SYRINGE (EA) MISCELLANEOUS
Qty: 90 EACH | Refills: 0 | Status: SHIPPED | OUTPATIENT
Start: 2025-02-13

## 2025-02-13 RX ORDER — INSULIN GLARGINE 100 [IU]/ML
20 INJECTION, SOLUTION SUBCUTANEOUS DAILY
Qty: 15 ML | Refills: 0 | Status: SHIPPED | OUTPATIENT
Start: 2025-02-14 | End: 2025-03-16

## 2025-02-13 RX ORDER — DEXTROSE 4 G
TABLET,CHEWABLE ORAL
Qty: 1 EACH | Refills: 0 | Status: SHIPPED | OUTPATIENT
Start: 2025-02-13

## 2025-02-13 RX ORDER — BLOOD-GLUCOSE CONTROL, NORMAL
EACH MISCELLANEOUS
Qty: 100 EACH | Refills: 0 | Status: SHIPPED | OUTPATIENT
Start: 2025-02-13

## 2025-02-13 RX ORDER — AMLODIPINE BESYLATE 5 MG/1
5 TABLET ORAL DAILY
Qty: 90 TABLET | Refills: 3 | Status: SHIPPED | OUTPATIENT
Start: 2025-02-14 | End: 2026-02-14

## 2025-02-13 RX ORDER — PEN NEEDLE, DIABETIC 30 GX3/16"
1 NEEDLE, DISPOSABLE MISCELLANEOUS
Qty: 100 EACH | Refills: 0 | Status: SHIPPED | OUTPATIENT
Start: 2025-02-13

## 2025-02-13 RX ORDER — ATORVASTATIN CALCIUM 40 MG/1
40 TABLET, FILM COATED ORAL DAILY
Qty: 30 TABLET | Refills: 0 | Status: SHIPPED | OUTPATIENT
Start: 2025-02-14 | End: 2025-03-16

## 2025-02-13 RX ORDER — CARVEDILOL 6.25 MG/1
6.25 TABLET ORAL 2 TIMES DAILY
Qty: 180 TABLET | Refills: 3 | Status: SHIPPED | OUTPATIENT
Start: 2025-02-13 | End: 2026-02-13

## 2025-02-13 RX ORDER — INSULIN ASPART 100 [IU]/ML
15 INJECTION, SOLUTION INTRAVENOUS; SUBCUTANEOUS
Status: DISCONTINUED | OUTPATIENT
Start: 2025-02-13 | End: 2025-02-13 | Stop reason: HOSPADM

## 2025-02-13 RX ORDER — INSULIN ASPART 100 [IU]/ML
15 INJECTION, SOLUTION INTRAVENOUS; SUBCUTANEOUS 3 TIMES DAILY
Qty: 15 ML | Refills: 0 | Status: SHIPPED | OUTPATIENT
Start: 2025-02-13 | End: 2025-03-16

## 2025-02-13 RX ADMIN — CARVEDILOL 6.25 MG: 6.25 TABLET, FILM COATED ORAL at 08:02

## 2025-02-13 RX ADMIN — INSULIN GLARGINE 20 UNITS: 100 INJECTION, SOLUTION SUBCUTANEOUS at 08:02

## 2025-02-13 RX ADMIN — INSULIN ASPART 3 UNITS: 100 INJECTION, SOLUTION INTRAVENOUS; SUBCUTANEOUS at 11:02

## 2025-02-13 RX ADMIN — LIDOCAINE 1 PATCH: 50 PATCH CUTANEOUS at 02:02

## 2025-02-13 RX ADMIN — INSULIN ASPART 4 UNITS: 100 INJECTION, SOLUTION INTRAVENOUS; SUBCUTANEOUS at 08:02

## 2025-02-13 RX ADMIN — ATORVASTATIN CALCIUM 40 MG: 20 TABLET, FILM COATED ORAL at 08:02

## 2025-02-13 RX ADMIN — INSULIN ASPART 12 UNITS: 100 INJECTION, SOLUTION INTRAVENOUS; SUBCUTANEOUS at 08:02

## 2025-02-13 RX ADMIN — AMLODIPINE BESYLATE 5 MG: 5 TABLET ORAL at 08:02

## 2025-02-13 RX ADMIN — INSULIN ASPART 15 UNITS: 100 INJECTION, SOLUTION INTRAVENOUS; SUBCUTANEOUS at 11:02

## 2025-02-13 RX ADMIN — POLYETHYLENE GLYCOL 3350 17 G: 17 POWDER, FOR SOLUTION ORAL at 08:02

## 2025-02-13 NOTE — PROGRESS NOTES
Food & Nutrition  Education    Diet Education: Diabetes  Time Spent: 20 minutes  Learners: pt      Nutrition Education provided with handouts:   Carbohydrate Counting for People with Diabetes    Comments:  Pt newly diagnosed diabetic. Long discussion with pt on carbohydrate food sources, portion sizes, and carb counting. We reviewed over common foods in his diet and changes he could make. Pt was very appreciative of information and very motivated to make changes. Handouts were provided.     All questions and concerns answered. Dietitian's contact information provided.       Follow-Up: 2/19/25    Please Re-consult as needed        Thanks!

## 2025-02-13 NOTE — PROGRESS NOTES
Smoking cessation education follow-up: Pt again denies nicotine withdrawal symptoms. Reviewed details of pt's initial Ambulatory Smoking Cessation clinic appointment; 2/26/25 at 1400, [facility] clinic.

## 2025-02-13 NOTE — PLAN OF CARE
Introduced as VN and will be reviewing discharge instructions.  Educated patient on reason for admission, home medication list, and discharge instructions including when to return to ED and the following doctor appointments.  Education per flowsheet.  Opportunity given for questions and questions answered.  Nurse  notified of   completion of discharge education. Patient is waiting for medications to be delivered fron phaarmacy

## 2025-02-13 NOTE — CARE UPDATE
02/12/25 1931   PRE-TX-O2   Device (Oxygen Therapy) room air   SpO2 98 %   Pulse Oximetry Type Intermittent   $ Pulse Oximetry - Multiple Charge Pulse Oximetry - Multiple

## 2025-02-13 NOTE — PROGRESS NOTES
Smoking cessation education note: Pt is a 0.33 pk/day cigarette smoker x 24 yrs. He denies nicotine withdrawal symptoms at this time. Pt states ready to quit smoking. Ambulatory referral to Smoking Cessation clinic following hospital discharge.

## 2025-02-13 NOTE — NURSING
PT HAD 8 BEAT RUN OF V-TACH.  PT'S /91 HR 66.  PT DENIES CHEST PAIN, PAIN, OR DISCOMFORT.  ON CALL PROVIDER (DG) NOTIFIED BY THIS NURSE (RM).  NO NEW ORDERS GIVEN.

## 2025-02-13 NOTE — PLAN OF CARE
Problem: Adult Inpatient Plan of Care  Goal: Plan of Care Review  Outcome: Progressing  Goal: Patient-Specific Goal (Individualized)  Outcome: Progressing  Goal: Absence of Hospital-Acquired Illness or Injury  Outcome: Progressing  Goal: Optimal Comfort and Wellbeing  Outcome: Progressing  Goal: Readiness for Transition of Care  Outcome: Progressing     Problem: Bariatric Environmental Safety  Goal: Safety Maintained with Care  Outcome: Progressing     Problem: Diabetic Ketoacidosis  Goal: Optimal Coping  Outcome: Progressing  Goal: Fluid and Electrolyte Balance with Absence of Ketosis  Outcome: Progressing     Problem: Diabetes Comorbidity  Goal: Blood Glucose Level Within Targeted Range  Outcome: Progressing     Problem: Acute Kidney Injury/Impairment  Goal: Fluid and Electrolyte Balance  Outcome: Progressing  Goal: Improved Oral Intake  Outcome: Progressing  Goal: Effective Renal Function  Outcome: Progressing

## 2025-02-13 NOTE — PLAN OF CARE
02/13/25 0930   Rounds   Attendance Provider;Nurse    Discharge Plan A Home with family   Why the patient remains in the hospital Requires continued medical care       0930  CM was informed by Dr Newby that the pt might be medically stable to discharge home later today if blood sugars improve. Pt was admitted with DKA, & continues to be followed by followed by DM educator and nut. Blood glucose was 301 & 316 this AM.    1335  DC order noted. Blood sugar was 288 at 1136.    Patient resting quietly in bed when CM rounded via [employer]. No family present. Patient in agreement with plan to discharge home today, denied the need for assistance with transportation at time of discharge, & verbalized understanding regarding the hospital follow up appointment with BRANDON Chew ([facility]).     Pt requested to dc with miralax. Message sent to Dr Newby informing of above.     Message sent to nurse Kimberly, charge nurse Michelle, & virtual nurse Isabelle informing that the pt is cleared to discharge.       Will continue to follow.

## 2025-02-14 NOTE — ASSESSMENT & PLAN NOTE
Patient's blood pressure range in the last 24 hours was: No data recorded.The patient's inpatient anti-hypertensive regimen is listed below:  Current Antihypertensives  amlodipine (NORVASC) tablet, Daily, Oral  carvedilol (COREG) tablet, 2 times daily, Oral    Plan  - BP is uncontrolled, will adjust as follows:  Start Coreg, started amlodipine, start losartan  - we will need to see PCP and will be given these medications at discharge

## 2025-02-14 NOTE — ASSESSMENT & PLAN NOTE
"Patient's FSGs are controlled on current medication regimen.  Last A1c reviewed-   Lab Results   Component Value Date    HGBA1C 14.0 (H) 02/10/2025     Most recent fingerstick glucose reviewed-   No results for input(s): "POCTGLUCOSE" in the last 24 hours.    Current correctional scale   insulin drip  Maintain anti-hyperglycemic dose as follows-   Antihyperglycemics (From admission, onward)    None        Hold Oral hypoglycemics while patient is in the hospital.    Currently on SSI and long acting insulin - titrate up as needed  Patient will need a glucometer and supplies, insulin pens, insulin pen needles, outpatient diabetes care and education referral, and follow up for diabetes management.  Will need to establish with PCP  "

## 2025-02-14 NOTE — PLAN OF CARE
[facility] - Telemetry  Discharge Final Note    Primary Care Provider: No, Primary Doctor    Expected Discharge Date: 2/13/2025    Final Discharge Note (most recent)       Final Note - 02/14/25 0757          Final Note    Assessment Type Final Discharge Note (P)      Anticipated Discharge Disposition Home or Self Care (P)      Hospital Resources/Appts/Education Provided Appointments scheduled and added to AVS (P)                      Contact Info       [facility]   Specialty: Behavioral Health, Psychiatry, Family Medicine    [address] [address]   Phone: [phone]       Next Steps: Follow up on 2/17/2025    Instructions: at 4:00pm; hospital follow up appointment with BRANDON Chew; Bring proof of current address and proof of income if herself or family member (check stub) is no insurance.  Bring Photo ID and Ins Card and Hospital Discharge papers

## 2025-02-14 NOTE — ASSESSMENT & PLAN NOTE
BARBIE is likely due to pre-renal azotemia due to intravascular volume depletion. Baseline creatinine is  1.0 . Most recent creatinine and eGFR are listed below.  Recent Labs     02/12/25  0830 02/13/25  0522   CREATININE 1.0 1.0   EGFRNORACEVR >60 >60        Plan  - BARBIE is resolved  - Avoid nephrotoxins and renally dose meds for GFR listed above  - Monitor urine output, serial BMP, and adjust therapy as needed  - likely BARBIE from polyuria from DKA

## 2025-02-14 NOTE — HOSPITAL COURSE
Initial workup revealed diabetic ketoacidosis with a glucose level in the 700s, pseudohyponatremia, metabolic acidosis, and acute kidney injury. Hemoglobin A1c was significantly elevated at 14%. He was admitted for DKA management and started on an insulin drip with subsequent transition to subcutaneous insulin therapy.  During hospitalization, BARBIE improved with IV fluids, and potassium was repleted. He was found to have severe hyperlipidemia with triglycerides >796 with initiation of a high-intensity statin. He was educated on diabetes management, provided with insulin supplies, and instructed to establish care with a primary provider for long-term follow-up. His condition stabilized, and he tolerated oral intake well. He will be discharged with an updated medication regimen and outpatient referrals.

## 2025-02-14 NOTE — ASSESSMENT & PLAN NOTE
Patient's most recent potassium results are listed below.   Recent Labs     02/12/25  0830 02/13/25  0522   K 4.2 4.0       Plan  - Replete potassium per protocol  - Monitor potassium Daily  - Patient's hypokalemia is improving

## 2025-02-24 ENCOUNTER — CLINICAL SUPPORT (OUTPATIENT)
Dept: DIABETES | Facility: CLINIC | Age: 50
End: 2025-02-24
Payer: COMMERCIAL

## 2025-02-24 DIAGNOSIS — E11.65 TYPE 2 DIABETES MELLITUS WITH HYPERGLYCEMIA, WITH LONG-TERM CURRENT USE OF INSULIN: ICD-10-CM

## 2025-02-24 DIAGNOSIS — Z79.4 TYPE 2 DIABETES MELLITUS WITH HYPERGLYCEMIA, WITH LONG-TERM CURRENT USE OF INSULIN: ICD-10-CM

## 2025-02-24 PROCEDURE — 99999 PR PBB SHADOW E&M-EST. PATIENT-LVL I: CPT | Mod: PBBFAC,,,

## 2025-02-24 PROCEDURE — G0108 DIAB MANAGE TRN  PER INDIV: HCPCS | Mod: PBBFAC | Performed by: DIETITIAN, REGISTERED

## 2025-02-24 PROCEDURE — 99211 OFF/OP EST MAY X REQ PHY/QHP: CPT | Mod: PBBFAC | Performed by: DIETITIAN, REGISTERED

## 2025-02-24 PROCEDURE — 99999PBSHW PR PBB SHADOW TECHNICAL ONLY FILED TO HB: Mod: PBBFAC,,,

## 2025-02-24 NOTE — PROGRESS NOTES
Diabetes Care Specialist Progress Note  Author: Amairani Mcduffie RD, CDE  Date: 2/24/2025      Intake  Program Intake  Reason for Diabetes Program Visit:: Initial Diabetes Assessment  Type of Intervention:: Individual  Individual: Education  Education:  (new onset T2 DM)    Current diabetes risk level:: high    Lab Results   Component Value Date    HGBA1C 14.0 (H) 02/10/2025       In the last month, have you used the ER or been admitted to the hospital: Yes  Was the ER or hospital admission related to diabetes?: Yes (admitted for mild DKA, new onset T2 DM Feb 2025)        Current Diabetes Treatment: Insulin  Method of insulin delivery?: Injections  Injection Type: Pens  Pen Type/Dose: Lantus, Novolog    Lantus 33 units in PM   Novolog 15 units AC          Glucose Monitoring  Patient has CGM: No    SMBG AC/HS  Reports 100s; highest 179                 Lifestyle Coping Support & Clinical  Lifestyle/Coping/Support  Learning Barriers:: None  Culture or Mandaen beliefs that may impact ability to access healthcare: No  Psychosocial/Coping Skills Assessment Completed: : Yes  Area of need?: No    Problem Review  Active Comorbidities: Hypertension, Hyperlipidemia/Dyslipidemia          Diabetes Self-Management Skills Assessment  Medication Skills Assessment  Patient is able to identify current diabetes medications, dosages, and appropriate timing of medications.: yes  Patient reports problems or concerns with current medication regimen.: no  Patient is  aware that some diabetes medications can cause low blood sugar?: Yes  Medication Skills Assessment Completed:: Yes  Assessment indicates:: Instruction Needed  Area of need?: Yes    Diabetes Disease Process/Treatment Options  Diabetes Type?: Type II  When were you diagnosed?: 2025  Diabetes Disease Process/Treatment Options: Skills Assessment Completed: Yes  Assessment indicates:: Instruction Needed  Area of need?: Yes    Nutrition/Healthy Eating  Patient can identify foods that  impact blood sugar.: yes  Nutrition/Healthy Eating Skills Assessment Completed:: Yes  Assessment indicates:: Instruction Needed  Area of need?: Yes    Physical Activity/Exercise  Patient can identify forms of physical activity.: yes  Physical Activity/Exercise Skills Assessment Completed: : Yes  Assessment indicates:: Instruction Needed  Area of need?: Yes    Home Blood Glucose Monitoring  Patient states that blood sugar is checked at home daily.: yes  Monitoring Method:: home glucometer  Home Blood Glucose Monitoring Skills Assessment Completed: : Yes  Assessment indicates:: Instruction Needed  Area of need?: Yes    Acute Complications  Have you ever had hypoglycemia (low BG 70 or less)?: no  Have you ever had DKA?: yes  When:: 2025  Acute Complications Skills Assessment Completed: : Yes  Assessment indicates:: Instruction Needed  Area of need?: Yes    Chronic Complications  Reviewed health maintenance: yes  Chronic Complications Skills Assessment Completed: : Yes  Assessment indicates:: Instruction Needed  Area of need?: Yes        Assessment Summary and Plan    Based on today's diabetes care assessment, the following areas of need were identified:      Identified Areas of Need      Medication/Current Diabetes Treatment: Yes  See Care Plan     Lifestyle Coping/Support: No   Diabetes Disease Process/Treatment Options: Yes  -Discussed qualifying parameters of diabetes dx. Discussed s/s typically associated with onset.  -Discussed risk factors of DM and identified possible risk factors leading to pt's onset.   -Discussed disease process and general progression over time.   -Reviewed current treatment options, especially dietary and lifestyle changes as well as medication additions and/or changes.         Nutrition/Healthy Eating: Yes   Emphasized importance of eliminating all sugar sweetened beverages, including fruit juice.   Discussed sugar free drink options.     Discussed carb vs non-carb foods.  Instructed on  appropriate amounts of carbs to have at meals and snacks.   Recommended 45 gm carb at meals  Recommended <15 gm carb at snacks    Instructed/reviewed how to read nutrition label and what to look for to determine total carb amounts.    Discussed need to limit total/saturated fats despite lesser effect on BG increase.   Encouraged carb sources primarily from whole grains, fresh/frozen fruits, and low-fat milk and yogurt.   Discussed meal plans and snack ideas amenable to pt.          Physical Activity/Exercise: Yes   -Discussed goals and benefits of regular physical activity.   -Reviewed difference between active lifestyle and structured physical activity.   -Encouraged physical activity with increased heart rate for sustained duration as tolerated.   -Reviewed physical activity goals of >150 minutes per week.     Home Blood Glucose Monitoring: Yes   Discussed goal BGs for different times of day and in relation to meals. Instructed pt to test BG AC/HS. Reviewed need for updated BG logs for all endo, PCP, and education appts.       Acute Complications: Yes   - Discussed hypoglycemia vs hyperglycemia symptoms and discussed appropriate treatments for each.   - Reviewed that pt is at high risk of hypo with current medication regimen.   - Discussed general vs severe hyperglycemia and risk of DKA/HHS.     Chronic Complications: Yes  Reviewed annual diabetes care schedule/patient priorities pertaining to other areas of health and prevention of chronic complications.       Today's interventions were provided through individual discussion, instruction, and written materials were provided.      Patient verbalized understanding of instruction and written materials.  Pt was able to return back demonstration of instructions today. Patient understood key points, needs reinforcement and further instruction.               Diabetes Self-Management Care Plan:    Today's Diabetes Self-Management Care Plan was developed with Shawn  input. Girish has agreed to work toward the following goal(s) to improve his/her overall diabetes control.      Care Plan: Diabetes Management   Updates made since 3/10/2024 12:00 AM        Problem: Medications         Goal: Patient Agrees to take Diabetes Medication(s) as instructed    Start Date: 2/24/2025   Expected End Date: 8/31/2025   Priority: High   Barriers: Knowledge deficit   Note:    2/24/25     DSMT for new onset T2       Girish is here for new onset T2DM education and hospital f/u.   He was admitted to hospital earlier this month for DKA and new onset DM.     Prior to admit, he experienced polyuria and polydipsia - now resolved.   He reports he was gaining weight prior to admit.     Since d/c, sugars have been in the 100s.  He has been titrating his basal insulin dose every week by 3 units as needed.     Previously drinking a lot of SSB, but has since stopped.   Significant diet changes .     Discussed timing and MOA of long and rapid acting insulins. Discussed overall goal of starting other T2 DM meds as able and decreasing insulin.       Needs f/u with discharge clinic           Task: Reviewed with patient all current diabetes medications and provided basic review of the purpose, dosage, frequency, side effects, and storage of both oral and injectable diabetes medications. Completed 3/10/2025        Task: Discussed guidelines for preventing, detecting and treating hypoglycemia and hyperglycemia and reviewed the importance of meal and medication timing with diabetes mediations for prevention of hypoglycemia and maximum drug benefit. Completed 3/10/2025                  Follow Up Plan     F/u after next A1c        Today's care plan and follow up schedule was discussed with patient.  Girish verbalized understanding of the care plan, goals, and agrees to follow up plan.        The patient was encouraged to communicate with his/her health care provider/physician and care team regarding his/her  condition(s) and treatment.  I provided the patient with my contact information today and encouraged to contact me via phone or [facility] Patient Portal as needed.           Length of Visit   Total Time: 60 Minutes

## 2025-02-26 ENCOUNTER — TELEPHONE (OUTPATIENT)
Dept: SMOKING CESSATION | Facility: CLINIC | Age: 50
End: 2025-02-26
Payer: MEDICAID

## 2025-02-26 NOTE — TELEPHONE ENCOUNTER
"Smoking Cessation clinic - Called pt after 5 mins no show to in-clinic intake appointment. Unable to leave voicemail with CTTS contact into as pt's phone number has "restrictions that would not allow the call to be completed".  -Zafar Vazquez, [facility], [facility]  "